# Patient Record
Sex: MALE | Race: OTHER | NOT HISPANIC OR LATINO | Employment: FULL TIME | URBAN - METROPOLITAN AREA
[De-identification: names, ages, dates, MRNs, and addresses within clinical notes are randomized per-mention and may not be internally consistent; named-entity substitution may affect disease eponyms.]

---

## 2021-10-20 ENCOUNTER — HOSPITAL ENCOUNTER (OUTPATIENT)
Dept: RADIOLOGY | Facility: HOSPITAL | Age: 56
Discharge: HOME | End: 2021-10-20
Attending: INTERNAL MEDICINE
Payer: COMMERCIAL

## 2021-10-20 DIAGNOSIS — I15.0 RENOVASCULAR HYPERTENSION: ICD-10-CM

## 2021-10-20 PROCEDURE — 93975 VASCULAR STUDY: CPT | Performed by: SURGERY

## 2021-10-20 PROCEDURE — 93975 VASCULAR STUDY: CPT

## 2022-12-30 ENCOUNTER — TELEPHONE (OUTPATIENT)
Dept: FAMILY MEDICINE CLINIC | Facility: CLINIC | Age: 57
End: 2022-12-30

## 2022-12-30 DIAGNOSIS — Z13.0 SCREENING FOR DEFICIENCY ANEMIA: ICD-10-CM

## 2022-12-30 DIAGNOSIS — E78.5 DYSLIPIDEMIA: ICD-10-CM

## 2022-12-30 DIAGNOSIS — R73.03 PRE-DIABETES: ICD-10-CM

## 2022-12-30 DIAGNOSIS — Z13.29 SCREENING FOR THYROID DISORDER: ICD-10-CM

## 2022-12-30 DIAGNOSIS — E53.8 B12 DEFICIENCY: ICD-10-CM

## 2022-12-30 DIAGNOSIS — Z12.5 SCREENING FOR PROSTATE CANCER: Primary | ICD-10-CM

## 2022-12-30 DIAGNOSIS — E55.9 VITAMIN D DEFICIENCY: ICD-10-CM

## 2023-01-04 DIAGNOSIS — E53.8 B12 DEFICIENCY: ICD-10-CM

## 2023-01-04 RX ORDER — CYANOCOBALAMIN 1000 UG/ML
INJECTION, SOLUTION INTRAMUSCULAR; SUBCUTANEOUS
Qty: 6 ML | Refills: 0 | Status: SHIPPED | OUTPATIENT
Start: 2023-01-04 | End: 2023-01-12 | Stop reason: SDUPTHER

## 2023-01-09 LAB
25(OH)D3+25(OH)D2 SERPL-MCNC: 34.2 NG/ML (ref 30–100)
ALBUMIN SERPL-MCNC: 4.6 G/DL (ref 3.8–4.9)
ALBUMIN/GLOB SERPL: 1.8 {RATIO} (ref 1.2–2.2)
ALP SERPL-CCNC: 110 IU/L (ref 44–121)
ALT SERPL-CCNC: 19 IU/L (ref 0–44)
APPEARANCE UR: CLEAR
AST SERPL-CCNC: 15 IU/L (ref 0–40)
BACTERIA UR CULT: NORMAL
BACTERIA URNS QL MICRO: ABNORMAL
BASOPHILS # BLD AUTO: 0.1 X10E3/UL (ref 0–0.2)
BASOPHILS NFR BLD AUTO: 1 %
BILIRUB SERPL-MCNC: 0.7 MG/DL (ref 0–1.2)
BILIRUB UR QL STRIP: NEGATIVE
BUN SERPL-MCNC: 12 MG/DL (ref 6–24)
BUN/CREAT SERPL: 11 (ref 9–20)
CALCIUM SERPL-MCNC: 9.7 MG/DL (ref 8.7–10.2)
CASTS URNS QL MICRO: ABNORMAL /LPF
CHLORIDE SERPL-SCNC: 100 MMOL/L (ref 96–106)
CHOLEST SERPL-MCNC: 251 MG/DL (ref 100–199)
CO2 SERPL-SCNC: 27 MMOL/L (ref 20–29)
COLOR UR: YELLOW
CREAT SERPL-MCNC: 1.14 MG/DL (ref 0.76–1.27)
EGFR: 75 ML/MIN/1.73
EOSINOPHIL # BLD AUTO: 0.1 X10E3/UL (ref 0–0.4)
EOSINOPHIL NFR BLD AUTO: 2 %
EPI CELLS #/AREA URNS HPF: ABNORMAL /HPF (ref 0–10)
ERYTHROCYTE [DISTWIDTH] IN BLOOD BY AUTOMATED COUNT: 12.5 % (ref 11.6–15.4)
GLOBULIN SER-MCNC: 2.5 G/DL (ref 1.5–4.5)
GLUCOSE SERPL-MCNC: 102 MG/DL (ref 70–99)
GLUCOSE UR QL: NEGATIVE
HBA1C MFR BLD: 5.7 % (ref 4.8–5.6)
HCT VFR BLD AUTO: 45 % (ref 37.5–51)
HDLC SERPL-MCNC: 43 MG/DL
HGB BLD-MCNC: 15.3 G/DL (ref 13–17.7)
HGB UR QL STRIP: NEGATIVE
IMM GRANULOCYTES # BLD: 0 X10E3/UL (ref 0–0.1)
IMM GRANULOCYTES NFR BLD: 0 %
KETONES UR QL STRIP: NEGATIVE
LDLC SERPL CALC-MCNC: 163 MG/DL (ref 0–99)
LEUKOCYTE ESTERASE UR QL STRIP: NEGATIVE
LYMPHOCYTES # BLD AUTO: 1.9 X10E3/UL (ref 0.7–3.1)
LYMPHOCYTES NFR BLD AUTO: 28 %
Lab: NORMAL
MCH RBC QN AUTO: 29.5 PG (ref 26.6–33)
MCHC RBC AUTO-ENTMCNC: 34 G/DL (ref 31.5–35.7)
MCV RBC AUTO: 87 FL (ref 79–97)
MICRO URNS: NORMAL
MICRO URNS: NORMAL
MONOCYTES # BLD AUTO: 0.5 X10E3/UL (ref 0.1–0.9)
MONOCYTES NFR BLD AUTO: 8 %
NEUTROPHILS # BLD AUTO: 4.1 X10E3/UL (ref 1.4–7)
NEUTROPHILS NFR BLD AUTO: 61 %
NITRITE UR QL STRIP: NEGATIVE
PH UR STRIP: 6 [PH] (ref 5–7.5)
PLATELET # BLD AUTO: 260 X10E3/UL (ref 150–450)
POTASSIUM SERPL-SCNC: 4.7 MMOL/L (ref 3.5–5.2)
PROT SERPL-MCNC: 7.1 G/DL (ref 6–8.5)
PROT UR QL STRIP: NEGATIVE
PSA SERPL-MCNC: 0.8 NG/ML (ref 0–4)
RBC # BLD AUTO: 5.18 X10E6/UL (ref 4.14–5.8)
RBC #/AREA URNS HPF: ABNORMAL /HPF (ref 0–2)
SL AMB URINALYSIS REFLEX: NORMAL
SL AMB VLDL CHOLESTEROL CALC: 45 MG/DL (ref 5–40)
SODIUM SERPL-SCNC: 142 MMOL/L (ref 134–144)
SP GR UR: 1.02 (ref 1–1.03)
TRIGL SERPL-MCNC: 244 MG/DL (ref 0–149)
TSH SERPL DL<=0.005 MIU/L-ACNC: 4.28 UIU/ML (ref 0.45–4.5)
UROBILINOGEN UR STRIP-ACNC: 0.2 MG/DL (ref 0.2–1)
VIT B12 SERPL-MCNC: 405 PG/ML (ref 232–1245)
WBC # BLD AUTO: 6.8 X10E3/UL (ref 3.4–10.8)
WBC #/AREA URNS HPF: ABNORMAL /HPF (ref 0–5)

## 2023-01-12 ENCOUNTER — OFFICE VISIT (OUTPATIENT)
Dept: FAMILY MEDICINE CLINIC | Facility: CLINIC | Age: 58
End: 2023-01-12

## 2023-01-12 VITALS — HEIGHT: 68 IN | HEART RATE: 102 BPM | BODY MASS INDEX: 28.04 KG/M2 | WEIGHT: 185 LBS | OXYGEN SATURATION: 100 %

## 2023-01-12 DIAGNOSIS — E53.8 B12 DEFICIENCY: ICD-10-CM

## 2023-01-12 DIAGNOSIS — E78.5 DYSLIPIDEMIA: ICD-10-CM

## 2023-01-12 DIAGNOSIS — R73.03 PREDIABETES: ICD-10-CM

## 2023-01-12 DIAGNOSIS — Z00.00 ANNUAL PHYSICAL EXAM: Primary | ICD-10-CM

## 2023-01-12 DIAGNOSIS — R73.03 PRE-DIABETES: ICD-10-CM

## 2023-01-12 RX ORDER — CYANOCOBALAMIN 1000 UG/ML
1000 INJECTION, SOLUTION INTRAMUSCULAR; SUBCUTANEOUS
Qty: 10 ML | Refills: 1 | Status: SHIPPED | OUTPATIENT
Start: 2023-01-12

## 2023-01-12 RX ORDER — MELATONIN
1000 2 TIMES DAILY
COMMUNITY

## 2023-01-12 RX ORDER — NEEDLES, DISPOSABLE 25GX5/8"
NEEDLE, DISPOSABLE MISCELLANEOUS
Qty: 50 EACH | Refills: 1 | Status: SHIPPED | OUTPATIENT
Start: 2023-01-12

## 2023-01-12 NOTE — PATIENT INSTRUCTIONS

## 2023-01-12 NOTE — PROGRESS NOTES
ADULT ANNUAL [address] [place],  [address] [facility]ARE [facility]    NAME: Bakari Ham  AGE: 61 y o  SEX: male  : 1965     DATE: 2023     Assessment and Plan:     Problem List Items Addressed This Visit        Other    B12 deficiency     Patient with a history of B12 deficiency currently taking B12 injections 1000 mcg monthly we will continue the same last level is 405         Relevant Medications    cyanocobalamin 1,000 mcg/mL    NEEDLE, DISP, 25 G (BD Disp Needles) 25G X 5/8" MISC    SYRINGE-NEEDLE, DISP, 3 ML (Luer Lock Safety Syringes) 25G X 5/8" 3 ML MISC    Dyslipidemia     Patient cholesterol is 251 triglycerides 244 HDL is 43 LDL is 163 currently not on any medications and patient is also hesitant to start now with medications  We will repeat the labs in 3 more months and if it is still high strongly suggest to start the medication  Relevant Orders    Lipid panel    Prediabetes     Patient's A1c came at 5 7 the prediabetic range emphasized regarding diet exercise monitoring his sugar intake carbohydrate intake and will follow with repeat blood sugar and hemoglobin A1c at a later date  Other Visit Diagnoses     Annual physical exam    -  Primary    Pre-diabetes        Relevant Orders    Hemoglobin A1C          Immunizations and preventive care screenings were discussed with patient today  Appropriate education was printed on patient's after visit summary  Discussed risks and benefits of prostate cancer screening  We discussed the controversial history of PSA screening for prostate cancer in the [place] as well as the risk of over detection and over treatment of prostate cancer by way of PSA screening    The patient understands that PSA blood testing is an imperfect way to screen for prostate cancer and that elevated PSA levels in the blood may also be caused by infection, inflammation, prostatic trauma or manipulation, urological procedures, or by benign prostatic enlargement  The role of the digital rectal examination in prostate cancer screening was also discussed and I discussed with him that there is large interobserver variability in the findings of digital rectal examination  Counseling:  · Alcohol/drug use: discussed moderation in alcohol intake, the recommendations for healthy alcohol use, and avoidance of illicit drug use  BMI Counseling: Body mass index is 28 13 kg/m²  The BMI is above normal  Nutrition recommendations include decreasing portion sizes, limiting drinks that contain sugar and moderation in carbohydrate intake  Exercise recommendations include moderate physical activity 150 minutes/week  Rationale for BMI follow-up plan is due to patient being overweight or obese  Return in about 3 months (around 4/12/2023)  Chief Complaint:     Chief Complaint   Patient presents with   • Physical Exam      History of Present Illness:     Adult Annual Physical   Patient here for a comprehensive physical exam  The patient reports problems - Patient with prediabetes, dyslipidemia history of B12 deficiency  Diet and Physical Activity  · Diet/Nutrition: well balanced diet and low fat diet  · Exercise: walking and 5-7 times a week on average  Depression Screening  PHQ-2/9 Depression Screening         General Health  · Sleep: sleeps well and gets 7-8 hours of sleep on average  · Hearing: normal - bilateral   · Vision: no vision problems  · Dental: regular dental visits   Health  · Symptoms include: none     Review of Systems:     Review of Systems   Constitutional: Negative for chills and fever  HENT: Negative for ear pain and sore throat  Eyes: Negative for pain and visual disturbance  Respiratory: Negative for cough and shortness of breath  Cardiovascular: Negative for chest pain and palpitations  Gastrointestinal: Negative for abdominal pain and vomiting     Genitourinary: Negative for dysuria and hematuria  Musculoskeletal: Negative for arthralgias and back pain  Skin: Negative for color change and rash  Neurological: Negative for seizures and syncope  All other systems reviewed and are negative  Past Medical History:     History reviewed  No pertinent past medical history  Past Surgical History:     History reviewed  No pertinent surgical history  Family History:     History reviewed  No pertinent family history  Social History:     Social History     Socioeconomic History   • Marital status: /Civil Union     Spouse name: None   • Number of children: None   • Years of education: None   • Highest education level: None   Occupational History   • None   Tobacco Use   • Smoking status: Never   • Smokeless tobacco: Never   Substance and Sexual Activity   • Alcohol use: None   • Drug use: None   • Sexual activity: None   Other Topics Concern   • None   Social History Narrative   • None     Social Determinants of Health     Financial Resource Strain: Not on file   Food Insecurity: Not on file   Transportation Needs: Not on file   Physical Activity: Not on file   Stress: Not on file   Social Connections: Not on file   Intimate Partner Violence: Not on file   Housing Stability: Not on file      Current Medications:     Current Outpatient Medications   Medication Sig Dispense Refill   • cholecalciferol (VITAMIN D3) 1,000 units tablet Take 1,000 Units by mouth 2 (two) times a day     • cyanocobalamin 1,000 mcg/mL Inject 1 mL (1,000 mcg total) into a muscle every 14 (fourteen) days 10 mL 1   • NEEDLE, DISP, 25 G (BD Disp Needles) 25G X 5/8" MISC Use every 14 (fourteen) days 50 each 1   • SYRINGE-NEEDLE, DISP, 3 ML (Luer Lock Safety Syringes) 25G X 5/8" 3 ML MISC Use every 14 (fourteen) days 50 each 0     No current facility-administered medications for this visit        Allergies:     No Known Allergies   Physical Exam:     Pulse 102   Ht 5' 8" (1 727 m)   Wt 83 9 kg (185 lb) SpO2 100%   BMI 28 13 kg/m²     Physical Exam  Vitals and nursing note reviewed  Constitutional:       General: He is not in acute distress  Appearance: He is well-developed  HENT:      Head: Normocephalic and atraumatic  Eyes:      Conjunctiva/sclera: Conjunctivae normal    Cardiovascular:      Rate and Rhythm: Normal rate and regular rhythm  Heart sounds: No murmur heard  Pulmonary:      Effort: Pulmonary effort is normal  No respiratory distress  Breath sounds: Normal breath sounds  Abdominal:      Palpations: Abdomen is soft  Tenderness: There is no abdominal tenderness  Musculoskeletal:         General: No swelling  Cervical back: Neck supple  Skin:     General: Skin is warm and dry  Neurological:      Mental Status: He is alert and oriented to person, place, and time     Psychiatric:         Mood and Affect: Mood normal           Tali Spears MD  [facility]

## 2023-01-15 PROBLEM — R73.03 PREDIABETES: Status: ACTIVE | Noted: 2023-01-15

## 2023-01-15 PROBLEM — E53.8 B12 DEFICIENCY: Status: ACTIVE | Noted: 2023-01-15

## 2023-01-15 PROBLEM — E78.5 DYSLIPIDEMIA: Status: ACTIVE | Noted: 2023-01-15

## 2023-01-15 NOTE — ASSESSMENT & PLAN NOTE
Patient cholesterol is 251 triglycerides 244 HDL is 43 LDL is 163 currently not on any medications and patient is also hesitant to start now with medications  We will repeat the labs in 3 more months and if it is still high strongly suggest to start the medication

## 2023-01-15 NOTE — ASSESSMENT & PLAN NOTE
Patient's A1c came at 5 7 the prediabetic range emphasized regarding diet exercise monitoring his sugar intake carbohydrate intake and will follow with repeat blood sugar and hemoglobin A1c at a later date

## 2023-01-15 NOTE — ASSESSMENT & PLAN NOTE
Patient with a history of B12 deficiency currently taking B12 injections 1000 mcg monthly we will continue the same last level is 405

## 2023-04-07 ENCOUNTER — RA CDI HCC (OUTPATIENT)
Dept: OTHER | Facility: HOSPITAL | Age: 58
End: 2023-04-07

## 2023-04-07 NOTE — PROGRESS NOTES
[facility] 75  coding opportunities       Chart reviewed, no opportunity found: CHART REVIEWED, NO OPPORTUNITY FOUND        Patients Insurance        Commercial Insurance: [employer]

## 2023-04-13 PROBLEM — R07.89 ATYPICAL CHEST PAIN: Status: ACTIVE | Noted: 2023-04-13

## 2023-04-13 PROBLEM — R10.13 DYSPEPSIA: Status: ACTIVE | Noted: 2023-04-13

## 2023-06-01 ENCOUNTER — OFFICE VISIT (OUTPATIENT)
Dept: GASTROENTEROLOGY | Facility: CLINIC | Age: 58
End: 2023-06-01

## 2023-06-01 VITALS
HEIGHT: 68 IN | BODY MASS INDEX: 28.19 KG/M2 | DIASTOLIC BLOOD PRESSURE: 105 MMHG | WEIGHT: 186 LBS | SYSTOLIC BLOOD PRESSURE: 167 MMHG | HEART RATE: 80 BPM

## 2023-06-01 DIAGNOSIS — Z53.20 COLON CANCER SCREENING DECLINED: ICD-10-CM

## 2023-06-01 DIAGNOSIS — E53.8 VITAMIN B12 DEFICIENCY: ICD-10-CM

## 2023-06-01 DIAGNOSIS — Z12.11 SCREEN FOR COLON CANCER: Primary | ICD-10-CM

## 2023-06-01 DIAGNOSIS — K21.9 GASTROESOPHAGEAL REFLUX DISEASE WITHOUT ESOPHAGITIS: ICD-10-CM

## 2023-06-01 NOTE — PROGRESS NOTES
[facility] - Outpatient Consultation  Brenna Randolph 60 y o  male MRN: [id number]  Encounter: [id number]          ASSESSMENT AND PLAN:      1  Screen for colon cancer    2  Colon cancer screening declined    3  Gastroesophageal reflux disease without esophagitis    4  Vitamin B12 deficiency      Patient has occasional heartburn indigestion more likely symptomatic treatment with Tums helps  History of B12 deficiency, may be associated with atrophic gastritis H  pylori infection  Gives history of pernicious anemia  Screening for colon cancer discussed, colonoscopy option and prep discussed at length  He wants to think about this and will call us when he is ready  We will suggest EGD at the same time because of above   ______________________________________________________________________    HPI:    Patient came in for evaluation of his occasional heartburn indigestion especially if he eats something spicy hot fried foods, appetite is fair weight stable, denies dysphagia coughing choking spells nocturnal reflux agitation bronchitis pneumonias  He takes Tums with symptomatic relief  Bowels are generally regular but lately has noted some stool consistency changed with use of stool  Does take vitamin B12 and vitamin D for deficiencies on a regular basis  Denies any history of ulcer disease, no GI bleeding melena hematochezia, denies chest pain shortness of breath fever chills rash, active, walks a lot  Diet medications more than 10 pertinent systems reviewed  Works in [employer], 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     REVIEW OF SYSTEMS:    CONSTITUTIONAL: Denies any fever, chills, rigors, and weight loss  HEENT: No earache or tinnitus  CARDIOVASCULAR: No chest pain or palpitations  RESPIRATORY: Denies any cough, hemoptysis, shortness of breath or dyspnea on exertion  GASTROINTESTINAL: As noted in the History of Present Illness     GENITOURINARY: Denies any "hematuria or dysuria  NEUROLOGIC: No dizziness or vertigo  MUSCULOSKELETAL: Denies any joint swellings  SKIN: Denies skin rashes or itching  ENDOCRINE: Denies excessive thirst  Denies intolerance to heat or cold  PSYCHOSOCIAL: Denies depression or anxiety  Denies any recent memory loss  Historical Information   History reviewed  No pertinent past medical history  History reviewed  No pertinent surgical history  Social History   Social History     Substance and Sexual Activity   Alcohol Use Never     Social History     Substance and Sexual Activity   Drug Use Never     Social History     Tobacco Use   Smoking Status Never   Smokeless Tobacco Never     History reviewed  No pertinent family history  Meds/Allergies       Current Outpatient Medications:   •  cholecalciferol (VITAMIN D3) 1,000 units tablet  •  cyanocobalamin 1,000 mcg/mL  •  NEEDLE, DISP, 25 G (BD Disp Needles) 25G X 5/8\" MISC  •  SYRINGE-NEEDLE, DISP, 3 ML (Luer Lock Safety Syringes) 25G X 5/8\" 3 ML MISC    No Known Allergies        Objective     Blood pressure (!) 167/105, pulse 80, height 5' 8\" (1 727 m), weight 84 4 kg (186 lb)  Body mass index is 28 28 kg/m²  PHYSICAL EXAM:      General Appearance:   Alert, cooperative, no distress   HEENT:   Normocephalic, atraumatic, anicteric  Neck:  Supple, symmetrical, trachea midline   Lungs:   Clear to auscultation bilaterally; no rales, rhonchi or wheezing; respirations unlabored    Heart[de-identified]   Regular rate and rhythm; no murmur  Abdomen:   Soft, non-tender, non-distended; normal bowel sounds; no masses, no organomegaly    Genitalia:   Deferred    Rectal:   Deferred    Extremities:  No cyanosis, clubbing or edema    Skin:  No jaundice, rashes, or lesions    Lymph nodes:  No palpable cervical lymphadenopathy        Lab Results:   No visits with results within 1 Day(s) from this visit     Latest known visit with results is:   Orders Only on 04/08/2023   Component Date Value   • " Cholesterol, Total 04/08/2023 238 (H)    • Triglycerides 04/08/2023 272 (H)    • HDL 04/08/2023 43    • VLDL Cholesterol Calcula* 04/08/2023 50 (H)    • LDL Calculated 04/08/2023 145 (H)    • Hemoglobin A1C 04/08/2023 5 9 (H)          Radiology Results:   No results found

## 2023-06-02 ENCOUNTER — TELEPHONE (OUTPATIENT)
Dept: OTHER | Facility: OTHER | Age: 58
End: 2023-06-02

## 2023-06-02 NOTE — TELEPHONE ENCOUNTER
Patient is calling to report that he was seen in Rm 2 at the office yesterday and his BP was reading unuasually high  He went home and measured it under various conditions and it was normal each time  He thinks you might want to check and recalibrate  the cuff

## 2023-07-26 LAB
CHOLEST SERPL-MCNC: 221 MG/DL (ref 100–199)
HDLC SERPL-MCNC: 47 MG/DL
LDLC SERPL CALC-MCNC: 149 MG/DL (ref 0–99)
SL AMB VLDL CHOLESTEROL CALC: 25 MG/DL (ref 5–40)
TRIGL SERPL-MCNC: 139 MG/DL (ref 0–149)

## 2023-07-28 ENCOUNTER — OFFICE VISIT (OUTPATIENT)
Dept: FAMILY MEDICINE CLINIC | Facility: CLINIC | Age: 58
End: 2023-07-28
Payer: COMMERCIAL

## 2023-07-28 VITALS
BODY MASS INDEX: 27.37 KG/M2 | SYSTOLIC BLOOD PRESSURE: 128 MMHG | HEIGHT: 68 IN | HEART RATE: 86 BPM | WEIGHT: 180.6 LBS | OXYGEN SATURATION: 100 % | DIASTOLIC BLOOD PRESSURE: 78 MMHG

## 2023-07-28 DIAGNOSIS — Z13.0 SCREENING FOR DEFICIENCY ANEMIA: ICD-10-CM

## 2023-07-28 DIAGNOSIS — R73.03 PRE-DIABETES: ICD-10-CM

## 2023-07-28 DIAGNOSIS — E55.9 VITAMIN D DEFICIENCY: ICD-10-CM

## 2023-07-28 DIAGNOSIS — Z12.5 SCREENING FOR PROSTATE CANCER: ICD-10-CM

## 2023-07-28 DIAGNOSIS — E53.8 B12 DEFICIENCY: Primary | ICD-10-CM

## 2023-07-28 DIAGNOSIS — Z13.29 SCREENING FOR THYROID DISORDER: ICD-10-CM

## 2023-07-28 DIAGNOSIS — E78.5 DYSLIPIDEMIA: ICD-10-CM

## 2023-07-28 DIAGNOSIS — R73.03 PREDIABETES: ICD-10-CM

## 2023-07-28 PROCEDURE — 99214 OFFICE O/P EST MOD 30 MIN: CPT | Performed by: INTERNAL MEDICINE

## 2023-07-28 NOTE — PROGRESS NOTES
Office Visit Note  23     Ld Woods 60 y.o. male MRN: [id number]  : 1965    Assessment:     1. Dyslipidemia  Assessment & Plan:  Patient with history of elevated cholesterol levels came down from 2 38-2 21 with an LDL of 149 patient is still trying to follow a strict diet we will repeat the levels in about 5 months from now and then reevaluate      2. B12 deficiency  Assessment & Plan:  Patient with a history of B12 deficiency is getting B12 injection 1000 mcg every 2 weeks we will follow-up with repeat level      3. Prediabetes  Assessment & Plan:  Last A1c was at 5.9 patient is trying to follow strict diet with good exercise we will follow-up with repeat A1c in few months from now and then decide. Discussion Summary and Plan: Today's care plan and medications were reviewed with patient in detail and all their questions answered to their satisfaction. Chief Complaint   Patient presents with   • Follow-up      Subjective:  Patient is coming here for a follow-up evaluation with regards to his symptoms of prediabetes, hyperlipidemia, hypercholesterolemia. Patient has been doing vigorous exercise lost about 6 pounds in last few months. He denies any symptoms of chest pain palpitation shortness of breath. Patient was seen by the gastroenterologist with regards to colorectal screening at present time is preferred to not to go through the colonoscopy I discussed with him about the Cologuard which we are going to order. Patient had lab work done cholesterol came down to 221 and LDL is at 149 triglycerides went down to 139. The following portions of the patient's history were reviewed and updated as appropriate: allergies, current medications, past family history, past medical history, past social history, past surgical history and problem list.    Review of Systems   Constitutional: Negative for chills and fever. HENT: Negative for ear pain and sore throat.     Eyes: Negative for pain and visual disturbance. Respiratory: Negative for cough and shortness of breath. Cardiovascular: Negative for chest pain and palpitations. Gastrointestinal: Negative for abdominal pain and vomiting. Genitourinary: Negative for dysuria and hematuria. Musculoskeletal: Negative for arthralgias and back pain. Skin: Negative for color change and rash. Neurological: Negative for seizures and syncope. All other systems reviewed and are negative. Historical Information   Patient Active Problem List   Diagnosis   • B12 deficiency   • Dyslipidemia   • Prediabetes   • Dyspepsia   • Atypical chest pain     History reviewed. No pertinent past medical history. History reviewed. No pertinent surgical history. Social History     Substance and Sexual Activity   Alcohol Use Never     Social History     Substance and Sexual Activity   Drug Use Never     Social History     Tobacco Use   Smoking Status Never   Smokeless Tobacco Never     History reviewed. No pertinent family history. Health Maintenance Due   Topic   • Hepatitis C Screening    • HIV Screening    • DTaP,Tdap,and Td Vaccines (1 - Tdap)   • Colorectal Cancer Screening    • COVID-19 Vaccine (3 - Pfizer series)   • Influenza Vaccine (1)   • BMI: Followup Plan       Meds/Allergies       Current Outpatient Medications:   •  cholecalciferol (VITAMIN D3) 1,000 units tablet, Take 1,000 Units by mouth 2 (two) times a day, Disp: , Rfl:   •  cyanocobalamin 1,000 mcg/mL, Inject 1 mL (1,000 mcg total) into a muscle every 14 (fourteen) days, Disp: 10 mL, Rfl: 1  •  SYRINGE-NEEDLE, DISP, 3 ML (Luer Lock Safety Syringes) 25G X 5/8" 3 ML MISC, Use every 14 (fourteen) days, Disp: 50 each, Rfl: 0      Objective:    Vitals:   /78 (BP Location: Right arm, Patient Position: Sitting, Cuff Size: Standard)   Pulse 86   Ht 5' 8" (1.727 m)   Wt 81.9 kg (180 lb 9.6 oz)   SpO2 100%   BMI 27.46 kg/m²   Body mass index is 27.46 kg/m².   Vitals: 07/28/23 1226   Weight: 81.9 kg (180 lb 9.6 oz)       Physical Exam  Vitals and nursing note reviewed. Constitutional:       General: He is not in acute distress. Appearance: Normal appearance. HENT:      Head: Normocephalic. Right Ear: Tympanic membrane and ear canal normal.      Left Ear: Tympanic membrane and ear canal normal.      Mouth/Throat:      Mouth: Mucous membranes are moist.   Eyes:      Pupils: Pupils are equal, round, and reactive to light. Cardiovascular:      Rate and Rhythm: Normal rate and regular rhythm. Heart sounds: Normal heart sounds. Pulmonary:      Effort: Pulmonary effort is normal. No respiratory distress. Breath sounds: Normal breath sounds. Abdominal:      General: Abdomen is flat. Palpations: Abdomen is soft. Musculoskeletal:      Cervical back: Normal range of motion and neck supple. Right lower leg: No edema. Left lower leg: No edema. Skin:     General: Skin is warm. Neurological:      Mental Status: He is alert and oriented to person, place, and time. Psychiatric:         Mood and Affect: Mood normal.         Lab Review   Orders Only on 07/25/2023   Component Date Value Ref Range Status   • Cholesterol, Total 07/25/2023 221 (H)  100 - 199 mg/dL Final   • Triglycerides 07/25/2023 139  0 - 149 mg/dL Final   • HDL 07/25/2023 47  >39 mg/dL Final   • VLDL Cholesterol Calculated 07/25/2023 25  5 - 40 mg/dL Final   • LDL Calculated 07/25/2023 149 (H)  0 - 99 mg/dL Final         Tricia Lynch MD        "This note has been constructed using a voice recognition system. Therefore there may be syntax, spelling, and/or grammatical errors.  Please call if you have any questions. "

## 2023-07-28 NOTE — ASSESSMENT & PLAN NOTE
Patient with history of elevated cholesterol levels came down from 2 38-2 21 with an LDL of 149 patient is still trying to follow a strict diet we will repeat the levels in about 5 months from now and then reevaluate

## 2023-07-28 NOTE — ASSESSMENT & PLAN NOTE
Patient with a history of B12 deficiency is getting B12 injection 1000 mcg every 2 weeks we will follow-up with repeat level

## 2023-07-28 NOTE — ASSESSMENT & PLAN NOTE
Last A1c was at 5.9 patient is trying to follow strict diet with good exercise we will follow-up with repeat A1c in few months from now and then decide.

## 2023-07-28 NOTE — ASSESSMENT & PLAN NOTE
Patient was seen by the gastroenterologist with regards to upper and lower endoscopy with a history of B12 deficiency the possibility of H. pylori because of the family history is there if patient decides we can get the upper and lower endoscopy done but meanwhile we will get the Cologuard test done.

## 2024-01-22 ENCOUNTER — RA CDI HCC (OUTPATIENT)
Dept: OTHER | Facility: HOSPITAL | Age: 59
End: 2024-01-22

## 2024-01-26 ENCOUNTER — OFFICE VISIT (OUTPATIENT)
Dept: FAMILY MEDICINE CLINIC | Facility: CLINIC | Age: 59
End: 2024-01-26
Payer: COMMERCIAL

## 2024-01-26 VITALS
HEART RATE: 90 BPM | BODY MASS INDEX: 28.19 KG/M2 | WEIGHT: 186 LBS | TEMPERATURE: 99 F | HEIGHT: 68 IN | OXYGEN SATURATION: 99 % | SYSTOLIC BLOOD PRESSURE: 130 MMHG | DIASTOLIC BLOOD PRESSURE: 78 MMHG

## 2024-01-26 DIAGNOSIS — R10.13 DYSPEPSIA: ICD-10-CM

## 2024-01-26 DIAGNOSIS — E53.8 B12 DEFICIENCY: ICD-10-CM

## 2024-01-26 DIAGNOSIS — R73.03 PREDIABETES: ICD-10-CM

## 2024-01-26 DIAGNOSIS — J20.8 ACUTE BRONCHITIS DUE TO OTHER SPECIFIED ORGANISMS: Primary | ICD-10-CM

## 2024-01-26 DIAGNOSIS — J20.9 ACUTE BRONCHITIS, UNSPECIFIED ORGANISM: ICD-10-CM

## 2024-01-26 DIAGNOSIS — E78.5 DYSLIPIDEMIA: ICD-10-CM

## 2024-01-26 PROCEDURE — 99214 OFFICE O/P EST MOD 30 MIN: CPT | Performed by: INTERNAL MEDICINE

## 2024-01-26 RX ORDER — AZITHROMYCIN 250 MG/1
TABLET, FILM COATED ORAL
Qty: 6 TABLET | Refills: 0 | Status: SHIPPED | OUTPATIENT
Start: 2024-01-26 | End: 2024-01-31

## 2024-01-26 NOTE — PROGRESS NOTES
Office Visit Note  24     Bakari Ham 58 y.o. male MRN: [id number]  : 1965    Assessment:     1. Acute bronchitis due to other specified organisms  Assessment & Plan:  Patient with cough congestion symptoms especially upper respiratory upper part of the chest bringing up small amount of whitish phlegm with low-grade temperature earlier he had symptoms of cold for almost 3 to 4 weeks.  Lungs sound mildly congested otherwise unremarkable.  Will monitor start him on Z-Severiano      2. B12 deficiency  Assessment & Plan:  History of B12 deficiency patient takes B12 injections 1000 mcg every 2 weeks will follow-up with repeat level      3. Dyslipidemia  Assessment & Plan:  History of elevated cholesterol levels last level was 221 with an LDL of 149 patient is going to have repeat lipid profile done soon based on that we will make decisions      4. Prediabetes  Assessment & Plan:  Last A1c 5.9 follow-up with repeat 1      5. Dyspepsia  Assessment & Plan:  Patient was seen by the gastroenterologist is planning to get upper and lower endoscopy scheduled as soon as possible I reviewed the notes from the gastroenterologist also.      6. Acute bronchitis, unspecified organism  -     azithromycin (ZITHROMAX) 250 mg tablet; Take 2 the first day, then take 1 daily               Discussion Summary and Plan:  Today's care plan and medications were reviewed with patient in detail and all their questions answered to their satisfaction.    Chief Complaint   Patient presents with    Cough      Subjective:  Patient is coming here for evaluation regarding symptoms of mild fever 100.3 developed yesterday and he has some generalized weakness some congestion in the upper part of the chest and throat area with cough bringing up small amount of whitish phlegm.  Earlier he had cold for almost 3 to 4 weeks at that time he was bringing up some yellowish phlegm.  All the family members seen at home have upper respiratory symptoms.   No nausea vomiting diarrhea symptoms no abdominal pain.  Patient did have headache yesterday and today's feels heavy in the head and body aches present.    Cough  Associated symptoms include myalgias. Pertinent negatives include no chest pain, chills, ear pain, fever, rash, sore throat or shortness of breath.       The following portions of the patient's history were reviewed and updated as appropriate: allergies, current medications, past family history, past medical history, past social history, past surgical history and problem list.    Review of Systems   Constitutional:  Negative for chills and fever.   HENT:  Positive for congestion. Negative for ear pain and sore throat.    Eyes:  Negative for pain and visual disturbance.   Respiratory:  Positive for cough. Negative for shortness of breath.    Cardiovascular:  Negative for chest pain and palpitations.   Gastrointestinal:  Negative for abdominal pain and vomiting.   Genitourinary:  Negative for dysuria and hematuria.   Musculoskeletal:  Positive for myalgias. Negative for arthralgias and back pain.   Skin:  Negative for color change and rash.   Neurological:  Positive for weakness. Negative for seizures and syncope.   All other systems reviewed and are negative.        Historical Information   Patient Active Problem List   Diagnosis    B12 deficiency    Dyslipidemia    Prediabetes    Dyspepsia    Atypical chest pain    Acute bronchitis due to other specified organisms     History reviewed. No pertinent past medical history.  History reviewed. No pertinent surgical history.  Social History     Substance and Sexual Activity   Alcohol Use Never     Social History     Substance and Sexual Activity   Drug Use Never     Social History     Tobacco Use   Smoking Status Never   Smokeless Tobacco Never     History reviewed. No pertinent family history.  Health Maintenance Due   Topic    Hepatitis C Screening     HIV Screening     DTaP,Tdap,and Td Vaccines (1 - Tdap)     "Colorectal Cancer Screening     Zoster Vaccine (1 of 2)    COVID-19 Vaccine (3 - 2023-24 season)    Annual Physical     Depression Screening       Meds/Allergies       Current Outpatient Medications:     azithromycin (ZITHROMAX) 250 mg tablet, Take 2 the first day, then take 1 daily, Disp: 6 tablet, Rfl: 0    cholecalciferol (VITAMIN D3) 1,000 units tablet, Take 1,000 Units by mouth 2 (two) times a day, Disp: , Rfl:     cyanocobalamin 1,000 mcg/mL, Inject 1 mL (1,000 mcg total) into a muscle every 14 (fourteen) days, Disp: 10 mL, Rfl: 1    SYRINGE-NEEDLE, DISP, 3 ML (Luer Lock Safety Syringes) 25G X 5/8\" 3 ML MISC, Use every 14 (fourteen) days, Disp: 50 each, Rfl: 0      Objective:    Vitals:   /78 (BP Location: Right arm, Patient Position: Sitting, Cuff Size: Standard)   Pulse 90   Temp 99 °F (37.2 °C)   Ht 5' 8\" (1.727 m)   Wt 84.4 kg (186 lb)   SpO2 99%   BMI 28.28 kg/m²   Body mass index is 28.28 kg/m².  Vitals:    01/26/24 1149   Weight: 84.4 kg (186 lb)       Physical Exam  Vitals and nursing note reviewed.   Constitutional:       Appearance: Normal appearance.   Cardiovascular:      Rate and Rhythm: Normal rate and regular rhythm.      Heart sounds: Normal heart sounds.   Pulmonary:      Effort: Pulmonary effort is normal.      Breath sounds: Normal breath sounds.   Abdominal:      General: Abdomen is flat.      Palpations: Abdomen is soft.   Musculoskeletal:      Right lower leg: No edema.      Left lower leg: No edema.   Skin:     General: Skin is warm and dry.   Neurological:      Mental Status: He is alert and oriented to person, place, and time.   Psychiatric:         Mood and Affect: Mood normal.         Behavior: Behavior normal.         Lab Review   No visits with results within 2 Month(s) from this visit.   Latest known visit with results is:   Orders Only on 07/25/2023   Component Date Value Ref Range Status    Cholesterol, Total 07/25/2023 221 (H)  100 - 199 mg/dL Final    Triglycerides " "07/25/2023 139  0 - 149 mg/dL Final    HDL 07/25/2023 47  >39 mg/dL Final    VLDL Cholesterol Calculated 07/25/2023 25  5 - 40 mg/dL Final    LDL Calculated 07/25/2023 149 (H)  0 - 99 mg/dL Final         Charlotte House MD        \"This note has been constructed using a voice recognition system.Therefore there may be syntax, spelling, and/or grammatical errors. Please call if you have any questions. \"  "

## 2024-01-26 NOTE — ASSESSMENT & PLAN NOTE
History of B12 deficiency patient takes B12 injections 1000 mcg every 2 weeks will follow-up with repeat level

## 2024-01-26 NOTE — ASSESSMENT & PLAN NOTE
History of elevated cholesterol levels last level was 221 with an LDL of 149 patient is going to have repeat lipid profile done soon based on that we will make decisions

## 2024-01-26 NOTE — ASSESSMENT & PLAN NOTE
Patient was seen by the gastroenterologist is planning to get upper and lower endoscopy scheduled as soon as possible I reviewed the notes from the gastroenterologist also.

## 2024-01-26 NOTE — ASSESSMENT & PLAN NOTE
Patient with cough congestion symptoms especially upper respiratory upper part of the chest bringing up small amount of whitish phlegm with low-grade temperature earlier he had symptoms of cold for almost 3 to 4 weeks.  Lungs sound mildly congested otherwise unremarkable.  Will monitor start him on Z-Severiano

## 2024-02-06 LAB
25(OH)D3+25(OH)D2 SERPL-MCNC: 31.6 NG/ML (ref 30–100)
ALBUMIN SERPL-MCNC: 4.5 G/DL (ref 3.8–4.9)
ALBUMIN/GLOB SERPL: 2 {RATIO} (ref 1.2–2.2)
ALP SERPL-CCNC: 99 IU/L (ref 44–121)
ALT SERPL-CCNC: 12 IU/L (ref 0–44)
APPEARANCE UR: CLEAR
AST SERPL-CCNC: 16 IU/L (ref 0–40)
BACTERIA URNS QL MICRO: NORMAL
BASOPHILS # BLD AUTO: 0.1 X10E3/UL (ref 0–0.2)
BASOPHILS NFR BLD AUTO: 1 %
BILIRUB SERPL-MCNC: 0.5 MG/DL (ref 0–1.2)
BILIRUB UR QL STRIP: NEGATIVE
BUN SERPL-MCNC: 10 MG/DL (ref 6–24)
BUN/CREAT SERPL: 9 (ref 9–20)
CALCIUM SERPL-MCNC: 9.6 MG/DL (ref 8.7–10.2)
CASTS URNS QL MICRO: NORMAL /LPF
CHLORIDE SERPL-SCNC: 103 MMOL/L (ref 96–106)
CHOLEST SERPL-MCNC: 204 MG/DL (ref 100–199)
CO2 SERPL-SCNC: 26 MMOL/L (ref 20–29)
COLOR UR: YELLOW
CREAT SERPL-MCNC: 1.09 MG/DL (ref 0.76–1.27)
EGFR: 78 ML/MIN/1.73
EOSINOPHIL # BLD AUTO: 0.2 X10E3/UL (ref 0–0.4)
EOSINOPHIL NFR BLD AUTO: 3 %
EPI CELLS #/AREA URNS HPF: NORMAL /HPF (ref 0–10)
ERYTHROCYTE [DISTWIDTH] IN BLOOD BY AUTOMATED COUNT: 12.3 % (ref 11.6–15.4)
GLOBULIN SER-MCNC: 2.3 G/DL (ref 1.5–4.5)
GLUCOSE SERPL-MCNC: 99 MG/DL (ref 70–99)
GLUCOSE UR QL: NEGATIVE
HBA1C MFR BLD: 5.9 % (ref 4.8–5.6)
HCT VFR BLD AUTO: 44.7 % (ref 37.5–51)
HDLC SERPL-MCNC: 46 MG/DL
HGB BLD-MCNC: 15.1 G/DL (ref 13–17.7)
HGB UR QL STRIP: NEGATIVE
IMM GRANULOCYTES # BLD: 0 X10E3/UL (ref 0–0.1)
IMM GRANULOCYTES NFR BLD: 1 %
KETONES UR QL STRIP: NEGATIVE
LDLC SERPL CALC-MCNC: 125 MG/DL (ref 0–99)
LEUKOCYTE ESTERASE UR QL STRIP: NEGATIVE
LYMPHOCYTES # BLD AUTO: 2.4 X10E3/UL (ref 0.7–3.1)
LYMPHOCYTES NFR BLD AUTO: 34 %
MCH RBC QN AUTO: 29.5 PG (ref 26.6–33)
MCHC RBC AUTO-ENTMCNC: 33.8 G/DL (ref 31.5–35.7)
MCV RBC AUTO: 87 FL (ref 79–97)
MICRO URNS: NORMAL
MICRO URNS: NORMAL
MONOCYTES # BLD AUTO: 0.5 X10E3/UL (ref 0.1–0.9)
MONOCYTES NFR BLD AUTO: 7 %
NEUTROPHILS # BLD AUTO: 3.9 X10E3/UL (ref 1.4–7)
NEUTROPHILS NFR BLD AUTO: 54 %
NITRITE UR QL STRIP: NEGATIVE
PH UR STRIP: 5.5 [PH] (ref 5–7.5)
PLATELET # BLD AUTO: 311 X10E3/UL (ref 150–450)
POTASSIUM SERPL-SCNC: 4.6 MMOL/L (ref 3.5–5.2)
PROT SERPL-MCNC: 6.8 G/DL (ref 6–8.5)
PROT UR QL STRIP: NEGATIVE
PSA SERPL-MCNC: 0.6 NG/ML (ref 0–4)
RBC # BLD AUTO: 5.12 X10E6/UL (ref 4.14–5.8)
RBC #/AREA URNS HPF: NORMAL /HPF (ref 0–2)
SL AMB URINALYSIS REFLEX: NORMAL
SL AMB VLDL CHOLESTEROL CALC: 33 MG/DL (ref 5–40)
SODIUM SERPL-SCNC: 141 MMOL/L (ref 134–144)
SP GR UR: 1.02 (ref 1–1.03)
TRIGL SERPL-MCNC: 189 MG/DL (ref 0–149)
TSH SERPL DL<=0.005 MIU/L-ACNC: 3.39 UIU/ML (ref 0.45–4.5)
UROBILINOGEN UR STRIP-ACNC: 0.2 MG/DL (ref 0.2–1)
VIT B12 SERPL-MCNC: 589 PG/ML (ref 232–1245)
WBC # BLD AUTO: 7.1 X10E3/UL (ref 3.4–10.8)
WBC #/AREA URNS HPF: NORMAL /HPF (ref 0–5)

## 2024-02-20 ENCOUNTER — OFFICE VISIT (OUTPATIENT)
Dept: FAMILY MEDICINE CLINIC | Facility: CLINIC | Age: 59
End: 2024-02-20
Payer: COMMERCIAL

## 2024-02-20 VITALS
TEMPERATURE: 97.6 F | WEIGHT: 184 LBS | HEIGHT: 68 IN | OXYGEN SATURATION: 99 % | HEART RATE: 95 BPM | SYSTOLIC BLOOD PRESSURE: 130 MMHG | DIASTOLIC BLOOD PRESSURE: 73 MMHG | BODY MASS INDEX: 27.89 KG/M2

## 2024-02-20 DIAGNOSIS — J20.8 ACUTE BRONCHITIS DUE TO OTHER SPECIFIED ORGANISMS: Primary | ICD-10-CM

## 2024-02-20 DIAGNOSIS — E53.8 B12 DEFICIENCY: ICD-10-CM

## 2024-02-20 DIAGNOSIS — E78.5 DYSLIPIDEMIA: ICD-10-CM

## 2024-02-20 DIAGNOSIS — E55.9 VITAMIN D DEFICIENCY: ICD-10-CM

## 2024-02-20 DIAGNOSIS — R73.03 PREDIABETES: ICD-10-CM

## 2024-02-20 PROCEDURE — 99396 PREV VISIT EST AGE 40-64: CPT | Performed by: INTERNAL MEDICINE

## 2024-02-20 RX ORDER — CYANOCOBALAMIN 1000 UG/ML
1000 INJECTION, SOLUTION INTRAMUSCULAR; SUBCUTANEOUS
Qty: 10 ML | Refills: 1 | Status: SHIPPED | OUTPATIENT
Start: 2024-02-20

## 2024-02-20 NOTE — PROGRESS NOTES
"ADULT ANNUAL PHYSICAL  [facility] PRIMARY [facility]    NAME: Bakari Ham  AGE: 58 y.o. SEX: male  : 1965     DATE: 2024     Assessment and Plan:     Problem List Items Addressed This Visit          Respiratory    Acute bronchitis due to other specified organisms - Primary     Acute bronchitis resolved            Other    B12 deficiency     B12 was 589 currently taking 1000 mcg every 2 weeks will continue         Relevant Medications    cyanocobalamin 1,000 mcg/mL    SYRINGE-NEEDLE, DISP, 3 ML (Luer Lock Safety Syringes) 25G X 5/8\" 3 ML MISC    Other Relevant Orders    Vitamin B12    Dyslipidemia     Test lipid profile shows cholesterol is at 204 it was 221 triglycerides were 139 now it is 189 HDL is at 46 and LDL at 125 came down from 149 continue to monitor with diet follow-up with repeat level in few months         Relevant Orders    Lipid panel    Prediabetes     A1c remains at 5.9 same as before follow-up periodically         Relevant Orders    Hemoglobin A1C     Other Visit Diagnoses       Vitamin D deficiency        Relevant Orders    Vitamin D 25 hydroxy            Immunizations and preventive care screenings were discussed with patient today. Appropriate education was printed on patient's after visit summary.    Discussed risks and benefits of prostate cancer screening. We discussed the controversial history of PSA screening for prostate cancer in the [place] as well as the risk of over detection and over treatment of prostate cancer by way of PSA screening.  The patient understands that PSA blood testing is an imperfect way to screen for prostate cancer and that elevated PSA levels in the blood may also be caused by infection, inflammation, prostatic trauma or manipulation, urological procedures, or by benign prostatic enlargement.    The role of the digital rectal examination in prostate cancer screening was also discussed and I discussed " with him that there is large interobserver variability in the findings of digital rectal examination.    Counseling:  Alcohol/drug use: discussed moderation in alcohol intake, the recommendations for healthy alcohol use, and avoidance of illicit drug use.  Dental Health: discussed importance of regular tooth brushing, flossing, and dental visits.  Injury prevention: discussed safety/seat belts, safety helmets, smoke detectors, carbon dioxide detectors, and smoking near bedding or upholstery.  Sexual health: discussed sexually transmitted diseases, partner selection, use of condoms, avoidance of unintended pregnancy, and contraceptive alternatives.  Exercise: the importance of regular exercise/physical activity was discussed. Recommend exercise 3-5 times per week for at least 30 minutes.       Depression Screening and Follow-up Plan: Patient was screened for depression during today's encounter. They screened negative with a PHQ-2 score of 0.        Return in about 6 months (around 8/20/2024).     Chief Complaint:   Patient is coming for a follow-up evaluation wellness checkup  Chief Complaint   Patient presents with    Annual Exam      History of Present Illness:   He recently had symptoms of acute bronchitis resolved with antibiotics.  Medications reviewed currently takes B12 injections labs reviewed.  Patient does complain of joint pains especially in the night.  Currently taking B12 and also vitamin D3 level of which came back at 31 patient currently taking 2000 units we will increase it to 4000 units daily follow-up with repeat vitamin D level in few months.  Adult Annual Physical   Patient here for a comprehensive physical exam. The patient reports no problems.    Diet and Physical Activity  Diet/Nutrition: well balanced diet, limited junk food, consuming 3-5 servings of fruits/vegetables daily, adequate fiber intake, and adequate whole grain intake.   Exercise: walking, 5-7 times a week on average, and 1-2  hours on average.      Depression Screening  PHQ-2/9 Depression Screening    Little interest or pleasure in doing things: 0 - not at all  Feeling down, depressed, or hopeless: 0 - not at all  Trouble falling or staying asleep, or sleeping too much: 0 - not at all  Feeling tired or having little energy: 0 - not at all  Poor appetite or overeatin - not at all  Feeling bad about yourself - or that you are a failure or have let yourself or your family down: 0 - not at all  Trouble concentrating on things, such as reading the newspaper or watching television: 0 - not at all  Moving or speaking so slowly that other people could have noticed. Or the opposite - being so fidgety or restless that you have been moving around a lot more than usual: 0 - not at all  Thoughts that you would be better off dead, or of hurting yourself in some way: 0 - not at all  PHQ-2 Score: 0  PHQ-2 Interpretation: Negative depression screen  PHQ-9 Score: 0  PHQ-9 Interpretation: No or Minimal depression       General Health  Sleep: sleeps well and gets 7-8 hours of sleep on average.   Hearing: normal - bilateral.  Vision: no vision problems.   Dental: regular dental visits, brushes teeth twice daily, and flosses teeth occasionally.        Health  Symptoms include: none    Advanced Care Planning  Do you have an advanced directive? yes  Do you have a durable medical power of ? no  ACP document given to patient? no     Review of Systems:     Review of Systems   Constitutional:  Negative for chills and fever.   HENT:  Negative for ear pain and sore throat.    Eyes:  Negative for pain and visual disturbance.   Respiratory:  Negative for cough and shortness of breath.    Cardiovascular:  Negative for chest pain and palpitations.   Gastrointestinal:  Negative for abdominal pain and vomiting.   Genitourinary:  Negative for dysuria and hematuria.   Musculoskeletal:  Negative for arthralgias and back pain.   Skin:  Negative for color change  "and rash.   Neurological:  Negative for seizures and syncope.   All other systems reviewed and are negative.     Past Medical History:     History reviewed. No pertinent past medical history.   Past Surgical History:     History reviewed. No pertinent surgical history.   Family History:     History reviewed. No pertinent family history.   Social History:     Social History     Socioeconomic History    Marital status: /Civil Union     Spouse name: None    Number of children: None    Years of education: None    Highest education level: None   Occupational History    None   Tobacco Use    Smoking status: Never    Smokeless tobacco: Never   Vaping Use    Vaping status: Never Used   Substance and Sexual Activity    Alcohol use: Never    Drug use: Never    Sexual activity: None   Other Topics Concern    None   Social History Narrative    None     Social Determinants of Health     Financial Resource Strain: Not on file   Food Insecurity: Not on file   Transportation Needs: Not on file   Physical Activity: Not on file   Stress: Not on file   Social Connections: Not on file   Intimate Partner Violence: Not on file   Housing Stability: Not on file      Current Medications:     Current Outpatient Medications   Medication Sig Dispense Refill    cholecalciferol (VITAMIN D3) 1,000 units tablet Take 4,000 Units by mouth daily      cyanocobalamin 1,000 mcg/mL Inject 1 mL (1,000 mcg total) into a muscle every 14 (fourteen) days 10 mL 1    SYRINGE-NEEDLE, DISP, 3 ML (Luer Lock Safety Syringes) 25G X 5/8\" 3 ML MISC Use every 14 (fourteen) days 50 each 0     No current facility-administered medications for this visit.      Allergies:     No Known Allergies   Physical Exam:     /73 (BP Location: Right arm, Patient Position: Sitting, Cuff Size: Standard)   Pulse 95   Temp 97.6 °F (36.4 °C)   Ht 5' 8\" (1.727 m)   Wt 83.5 kg (184 lb)   SpO2 99%   BMI 27.98 kg/m²     Physical Exam  Vitals and nursing note reviewed. "   Constitutional:       General: He is not in acute distress.     Appearance: He is well-developed.   HENT:      Head: Normocephalic and atraumatic.   Eyes:      Conjunctiva/sclera: Conjunctivae normal.   Cardiovascular:      Rate and Rhythm: Normal rate and regular rhythm.      Heart sounds: No murmur heard.  Pulmonary:      Effort: Pulmonary effort is normal. No respiratory distress.      Breath sounds: Normal breath sounds.   Abdominal:      Palpations: Abdomen is soft.      Tenderness: There is no abdominal tenderness.   Musculoskeletal:         General: No swelling.      Cervical back: Neck supple.   Skin:     General: Skin is warm and dry.      Capillary Refill: Capillary refill takes less than 2 seconds.   Neurological:      Mental Status: He is alert.   Psychiatric:         Mood and Affect: Mood normal.        Recent Results (from the past 1344 hour(s))   CBC and differential    Collection Time: 02/05/24  7:05 AM   Result Value Ref Range    White Blood Cell Count 7.1 3.4 - 10.8 x10E3/uL    Red Blood Cell Count 5.12 4.14 - 5.80 x10E6/uL    Hemoglobin 15.1 13.0 - 17.7 g/dL    HCT 44.7 37.5 - 51.0 %    MCV 87 79 - 97 fL    MCH 29.5 26.6 - 33.0 pg    MCHC 33.8 31.5 - 35.7 g/dL    RDW 12.3 11.6 - 15.4 %    Platelet Count 311 150 - 450 x10E3/uL    Neutrophils 54 Not Estab. %    Lymphocytes 34 Not Estab. %    Monocytes 7 Not Estab. %    Eosinophils 3 Not Estab. %    Basophils PCT 1 Not Estab. %    Neutrophils (Absolute) 3.9 1.4 - 7.0 x10E3/uL    Lymphocytes (Absolute) 2.4 0.7 - 3.1 x10E3/uL    Monocytes (Absolute) 0.5 0.1 - 0.9 x10E3/uL    Eosinophils (Absolute) 0.2 0.0 - 0.4 x10E3/uL    Basophils ABS 0.1 0.0 - 0.2 x10E3/uL    Immature Granulocytes 1 Not Estab. %    Immature Granulocytes (Absolute) 0.0 0.0 - 0.1 x10E3/uL   Comprehensive metabolic panel    Collection Time: 02/05/24  7:05 AM   Result Value Ref Range    Glucose, Random 99 70 - 99 mg/dL    BUN 10 6 - 24 mg/dL    Creatinine 1.09 0.76 - 1.27 mg/dL     eGFR 78 >59 mL/min/1.73    SL AMB BUN/CREATININE RATIO 9 9 - 20    Sodium 141 134 - 144 mmol/L    Potassium 4.6 3.5 - 5.2 mmol/L    Chloride 103 96 - 106 mmol/L    CO2 26 20 - 29 mmol/L    CALCIUM 9.6 8.7 - 10.2 mg/dL    Protein, Total 6.8 6.0 - 8.5 g/dL    Albumin 4.5 3.8 - 4.9 g/dL    Globulin, Total 2.3 1.5 - 4.5 g/dL    Albumin/Globulin Ratio 2.0 1.2 - 2.2    TOTAL BILIRUBIN 0.5 0.0 - 1.2 mg/dL    Alk Phos Isoenzymes 99 44 - 121 IU/L    AST 16 0 - 40 IU/L    ALT 12 0 - 44 IU/L   UA/M w/rflx Culture, Routine    Collection Time: 02/05/24  7:05 AM   Result Value Ref Range    Specific Gravity 1.017 1.005 - 1.030    Ph 5.5 5.0 - 7.5    Color UA Yellow Yellow    Urine Appearance Clear Clear    Leukocyte Esterase Negative Negative    Protein Negative Negative/Trace    Glucose, 24 HR Urine Negative Negative    Ketone, Urine Negative Negative    Blood, Urine Negative Negative    Bilirubin, Urine Negative Negative    Urobilinogen Urine 0.2 0.2 - 1.0 mg/dL    Nitrites Urine Negative Negative    Microscopic Examination Comment     Microscopic Examination See below:     Urinalysis Reflex Comment    Microscopic Examination    Collection Time: 02/05/24  7:05 AM   Result Value Ref Range    SL AMB WBC, URINE None seen 0 - 5 /hpf    RBC, Urine None seen 0 - 2 /hpf    Epithelial Cells (non renal) None seen 0 - 10 /hpf    Casts None seen None seen /lpf    Bacteria, Urine None seen None seen/Few   Lipid panel    Collection Time: 02/05/24  7:05 AM   Result Value Ref Range    Cholesterol, Total 204 (H) 100 - 199 mg/dL    Triglycerides 189 (H) 0 - 149 mg/dL    HDL 46 >39 mg/dL    VLDL Cholesterol Calculated 33 5 - 40 mg/dL    LDL Calculated 125 (H) 0 - 99 mg/dL   Hemoglobin A1c (w/out EAG)    Collection Time: 02/05/24  7:05 AM   Result Value Ref Range    Hemoglobin A1C 5.9 (H) 4.8 - 5.6 %   PSA Total, Diagnostic    Collection Time: 02/05/24  7:05 AM   Result Value Ref Range    Prostate Specific Antigen Total 0.6 0.0 - 4.0 ng/mL    Vitamin D 25 hydroxy    Collection Time: 02/05/24  7:05 AM   Result Value Ref Range    25-HYDROXY VIT D 31.6 30.0 - 100.0 ng/mL   TSH, 3rd generation with Free T4 reflex    Collection Time: 02/05/24  7:05 AM   Result Value Ref Range    TSH 3.390 0.450 - 4.500 uIU/mL   Vitamin B12    Collection Time: 02/05/24  7:05 AM   Result Value Ref Range    Vitamin B-12 589 232 - 1,245 pg/mL        Charlotte House MD  [facility]

## 2024-02-20 NOTE — ASSESSMENT & PLAN NOTE
Test lipid profile shows cholesterol is at 204 it was 221 triglycerides were 139 now it is 189 HDL is at 46 and LDL at 125 came down from 149 continue to monitor with diet follow-up with repeat level in few months

## 2024-02-21 PROBLEM — J20.8 ACUTE BRONCHITIS DUE TO OTHER SPECIFIED ORGANISMS: Status: RESOLVED | Noted: 2024-01-26 | Resolved: 2024-02-21

## 2024-06-14 LAB
25(OH)D3+25(OH)D2 SERPL-MCNC: 39.6 NG/ML (ref 30–100)
CHOLEST SERPL-MCNC: 212 MG/DL (ref 100–199)
HBA1C MFR BLD: 5.9 % (ref 4.8–5.6)
HDLC SERPL-MCNC: 40 MG/DL
LDLC SERPL CALC-MCNC: 133 MG/DL (ref 0–99)
SL AMB VLDL CHOLESTEROL CALC: 39 MG/DL (ref 5–40)
TRIGL SERPL-MCNC: 216 MG/DL (ref 0–149)
VIT B12 SERPL-MCNC: 639 PG/ML (ref 232–1245)

## 2024-06-14 NOTE — PROGRESS NOTES
Office Visit Note  24     Bakari Ham 59 y.o. male MRN: [id number]  : 1965    Assessment:     1. Dyslipidemia  Assessment & Plan:  Cholesterol level came up slightly higher than before at 212 it was 204 triglycerides 216 HDL 14 LDL at 133 emphasized regarding diet exercise lifestyle modification which she has been trying to follow but still the cholesterol coming up high.  Will try with diet 1 more time according to him he will work harder with diet and exercise with patient coming up high in 2025 he will go on medication.  Orders:  -     Lipid panel; Future  -     Lipid panel  2. B12 deficiency  Assessment & Plan:  Patient has B12 deficiency currently taking 1000 mcg of B12 every 2 weeks in the form of injection last B12 level came back at 639 will continue with the same for now.  Orders:  -     Vitamin B12; Future  -     Vitamin B12  3. Pre-diabetes  -     Comprehensive metabolic panel; Future  -     Hemoglobin A1C; Future; Expected date: 2024  -     UA w Reflex to Microscopic w Reflex to Culture; Future; Expected date: 2024  -     Comprehensive metabolic panel  -     Hemoglobin A1C  4. Prediabetes  Assessment & Plan:  Lab reveals his A1c is still same as before at 5.9 again emphasized regarding diet exercise lifestyle modification cutting back on sweet intake carbohydrate intake  5. Dyspepsia  6. Screening PSA (prostate specific antigen)  -     PSA, Total Screen; Future  7. Screening for thyroid disorder  -     TSH, 3rd generation with Free T4 reflex; Future; Expected date: 2024  -     TSH, 3rd generation with Free T4 reflex  8. Screening for deficiency anemia  -     CBC and differential; Future  -     CBC and differential  9. Screening for colon cancer  -     Cologuard             Discussion Summary and Plan:  Today's care plan and medications were reviewed with patient in detail and all their questions answered to their satisfaction.    Chief Complaint   Patient  presents with   • Follow-up      Subjective:  Patient is coming here for a follow-up evaluation with regards to symptoms of with a history of B12 deficiency and also history of hypercholesterolemia patient has been trying very hard with exercise and diet to bring his cholesterol down and reviewed the last 2 years reports with the cholesterol that had been fluctuating the latest when she was 212 with an elevated triglyceride levels.  Currently not on any medications he is on B12 supplements in the form of injection history of B12 deficiency.        The following portions of the patient's history were reviewed and updated as appropriate: allergies, current medications, past family history, past medical history, past social history, past surgical history and problem list.    Review of Systems   Constitutional:  Negative for chills and fever.   HENT:  Negative for ear pain and sore throat.    Eyes:  Negative for pain and visual disturbance.   Respiratory:  Negative for cough and shortness of breath.    Cardiovascular:  Negative for chest pain and palpitations.   Gastrointestinal:  Negative for abdominal pain and vomiting.   Genitourinary:  Negative for dysuria and hematuria.   Musculoskeletal:  Negative for arthralgias and back pain.   Skin:  Negative for color change and rash.   Neurological:  Negative for seizures and syncope.   All other systems reviewed and are negative.        Historical Information   Patient Active Problem List   Diagnosis   • B12 deficiency   • Dyslipidemia   • Prediabetes   • Dyspepsia   • Atypical chest pain     History reviewed. No pertinent past medical history.  History reviewed. No pertinent surgical history.  Social History     Substance and Sexual Activity   Alcohol Use Never     Social History     Substance and Sexual Activity   Drug Use Never     Social History     Tobacco Use   Smoking Status Never   Smokeless Tobacco Never     History reviewed. No pertinent family history.  Health  "Maintenance Due   Topic   • Hepatitis C Screening    • HIV Screening    • DTaP,Tdap,and Td Vaccines (1 - Tdap)   • Colorectal Cancer Screening    • Zoster Vaccine (1 of 2)   • COVID-19 Vaccine (3 - 2023-24 season)   • RSV Vaccine Age 60+ Years (1 - 1-dose 60+ series)      Meds/Allergies       Current Outpatient Medications:   •  cholecalciferol (VITAMIN D3) 1,000 units tablet, Take 4,000 Units by mouth daily, Disp: , Rfl:   •  cyanocobalamin 1,000 mcg/mL, Inject 1 mL (1,000 mcg total) into a muscle every 14 (fourteen) days, Disp: 10 mL, Rfl: 1  •  SYRINGE-NEEDLE, DISP, 3 ML (Luer Lock Safety Syringes) 25G X 5/8\" 3 ML MISC, Use every 14 (fourteen) days, Disp: 50 each, Rfl: 0      Objective:    Vitals:   /72 (BP Location: Right arm, Patient Position: Sitting, Cuff Size: Standard)   Ht 5' 8\" (1.727 m)   Wt 83.2 kg (183 lb 6.4 oz)   BMI 27.89 kg/m²   Body mass index is 27.89 kg/m².  Vitals:    06/17/24 1107   Weight: 83.2 kg (183 lb 6.4 oz)       Physical Exam  Vitals and nursing note reviewed.   Constitutional:       Appearance: Normal appearance.   Cardiovascular:      Rate and Rhythm: Normal rate and regular rhythm.      Heart sounds: Normal heart sounds.   Pulmonary:      Effort: Pulmonary effort is normal.      Breath sounds: Normal breath sounds.   Abdominal:      Palpations: Abdomen is soft.   Musculoskeletal:      Right lower leg: No edema.      Left lower leg: No edema.   Skin:     General: Skin is dry.   Neurological:      Mental Status: He is alert and oriented to person, place, and time.   Psychiatric:         Mood and Affect: Mood normal.         Behavior: Behavior normal.         Lab Review   Orders Only on 06/13/2024   Component Date Value Ref Range Status   • Cholesterol, Total 06/13/2024 212 (H)  100 - 199 mg/dL Final   • Triglycerides 06/13/2024 216 (H)  0 - 149 mg/dL Final   • HDL 06/13/2024 40  >39 mg/dL Final   • VLDL Cholesterol Calculated 06/13/2024 39  5 - 40 mg/dL Final   • LDL " "Calculated 06/13/2024 133 (H)  0 - 99 mg/dL Final   • Hemoglobin A1C 06/13/2024 5.9 (H)  4.8 - 5.6 % Final    Comment:          Prediabetes: 5.7 - 6.4           Diabetes: >6.4           Glycemic control for adults with diabetes: <7.0     • 25-HYDROXY VIT D 06/13/2024 39.6  30.0 - 100.0 ng/mL Final    Comment: Vitamin D deficiency has been defined by the [place] [employer]  [employer] and an Endocrine Society practice guideline as a  level of serum 25-OH vitamin D less than 20 ng/mL (1,2).  The Endocrine Society went on to further define vitamin D  insufficiency as a level between 21 and 29 ng/mL (2).  1. IOM ([place] [employer]). 2010. Dietary reference     intakes for calcium and D. [place]: The     National Academies Press.  2. Camelia MF, Ashly ORTEGA, Mojgan MCCOY, et al.     Evaluation, treatment, and prevention of vitamin D     deficiency: an Endocrine Society clinical practice     guideline. JCEM. 2011 Jul; 96(7):1911-30.     • Vitamin B-12 06/13/2024 639  232 - 1,245 pg/mL Final         Charlotte House MD        \"This note has been constructed using a voice recognition system.Therefore there may be syntax, spelling, and/or grammatical errors. Please call if you have any questions. \"  "

## 2024-06-17 ENCOUNTER — OFFICE VISIT (OUTPATIENT)
Dept: FAMILY MEDICINE CLINIC | Facility: CLINIC | Age: 59
End: 2024-06-17
Payer: COMMERCIAL

## 2024-06-17 VITALS
BODY MASS INDEX: 27.8 KG/M2 | DIASTOLIC BLOOD PRESSURE: 72 MMHG | WEIGHT: 183.4 LBS | HEIGHT: 68 IN | SYSTOLIC BLOOD PRESSURE: 130 MMHG

## 2024-06-17 DIAGNOSIS — R73.03 PREDIABETES: ICD-10-CM

## 2024-06-17 DIAGNOSIS — Z13.0 SCREENING FOR DEFICIENCY ANEMIA: ICD-10-CM

## 2024-06-17 DIAGNOSIS — Z13.29 SCREENING FOR THYROID DISORDER: ICD-10-CM

## 2024-06-17 DIAGNOSIS — E78.5 DYSLIPIDEMIA: Primary | ICD-10-CM

## 2024-06-17 DIAGNOSIS — Z12.11 SCREENING FOR COLON CANCER: ICD-10-CM

## 2024-06-17 DIAGNOSIS — R10.13 DYSPEPSIA: ICD-10-CM

## 2024-06-17 DIAGNOSIS — R73.03 PRE-DIABETES: ICD-10-CM

## 2024-06-17 DIAGNOSIS — E53.8 B12 DEFICIENCY: ICD-10-CM

## 2024-06-17 DIAGNOSIS — Z12.5 SCREENING PSA (PROSTATE SPECIFIC ANTIGEN): ICD-10-CM

## 2024-06-17 PROCEDURE — 99214 OFFICE O/P EST MOD 30 MIN: CPT | Performed by: INTERNAL MEDICINE

## 2024-06-17 NOTE — ASSESSMENT & PLAN NOTE
Patient has B12 deficiency currently taking 1000 mcg of B12 every 2 weeks in the form of injection last B12 level came back at 639 will continue with the same for now.

## 2024-06-17 NOTE — ASSESSMENT & PLAN NOTE
Cholesterol level came up slightly higher than before at 212 it was 204 triglycerides 216 HDL 14 LDL at 133 emphasized regarding diet exercise lifestyle modification which she has been trying to follow but still the cholesterol coming up high.  Will try with diet 1 more time according to him he will work harder with diet and exercise with patient coming up high in January 2025 he will go on medication.

## 2024-06-17 NOTE — ASSESSMENT & PLAN NOTE
Lab reveals his A1c is still same as before at 5.9 again emphasized regarding diet exercise lifestyle modification cutting back on sweet intake carbohydrate intake

## 2024-06-25 LAB — COLOGUARD RESULT REPORTABLE: NORMAL

## 2024-06-28 ENCOUNTER — TELEPHONE (OUTPATIENT)
Dept: FAMILY MEDICINE CLINIC | Facility: CLINIC | Age: 59
End: 2024-06-28

## 2024-07-05 LAB — COLOGUARD RESULT REPORTABLE: NEGATIVE

## 2024-08-25 DIAGNOSIS — E53.8 B12 DEFICIENCY: ICD-10-CM

## 2024-08-26 RX ORDER — CYANOCOBALAMIN 1000 UG/ML
1000 INJECTION, SOLUTION INTRAMUSCULAR; SUBCUTANEOUS
Qty: 10 ML | Refills: 1 | Status: SHIPPED | OUTPATIENT
Start: 2024-08-26

## 2025-02-13 ENCOUNTER — TELEPHONE (OUTPATIENT)
Dept: FAMILY MEDICINE CLINIC | Facility: CLINIC | Age: 60
End: 2025-02-13

## 2025-02-13 DIAGNOSIS — R73.03 PRE-DIABETES: Primary | ICD-10-CM

## 2025-02-13 DIAGNOSIS — E55.9 VITAMIN D DEFICIENCY: ICD-10-CM

## 2025-02-14 ENCOUNTER — RESULTS FOLLOW-UP (OUTPATIENT)
Dept: FAMILY MEDICINE CLINIC | Facility: CLINIC | Age: 60
End: 2025-02-14

## 2025-02-14 LAB
25(OH)D3+25(OH)D2 SERPL-MCNC: 43.1 NG/ML (ref 30–100)
ALBUMIN SERPL-MCNC: 4.4 G/DL (ref 3.8–4.9)
ALP SERPL-CCNC: 89 IU/L (ref 44–121)
ALT SERPL-CCNC: 11 IU/L (ref 0–44)
APPEARANCE UR: CLEAR
AST SERPL-CCNC: 18 IU/L (ref 0–40)
BACTERIA URNS QL MICRO: NORMAL
BASOPHILS # BLD AUTO: 0 X10E3/UL (ref 0–0.2)
BASOPHILS NFR BLD AUTO: 1 %
BILIRUB SERPL-MCNC: 0.6 MG/DL (ref 0–1.2)
BILIRUB UR QL STRIP: NEGATIVE
BUN SERPL-MCNC: 15 MG/DL (ref 8–27)
BUN/CREAT SERPL: 13 (ref 10–24)
CALCIUM SERPL-MCNC: 9.3 MG/DL (ref 8.6–10.2)
CASTS URNS QL MICRO: NORMAL /LPF
CHLORIDE SERPL-SCNC: 99 MMOL/L (ref 96–106)
CHOLEST SERPL-MCNC: 224 MG/DL (ref 100–199)
CHOLEST/HDLC SERPL: 4.6 RATIO (ref 0–5)
CO2 SERPL-SCNC: 26 MMOL/L (ref 20–29)
COLOR UR: YELLOW
CREAT SERPL-MCNC: 1.14 MG/DL (ref 0.76–1.27)
EGFR: 74 ML/MIN/1.73
EOSINOPHIL # BLD AUTO: 0.1 X10E3/UL (ref 0–0.4)
EOSINOPHIL NFR BLD AUTO: 2 %
EPI CELLS #/AREA URNS HPF: NORMAL /HPF (ref 0–10)
ERYTHROCYTE [DISTWIDTH] IN BLOOD BY AUTOMATED COUNT: 12.3 % (ref 11.6–15.4)
EST. AVERAGE GLUCOSE BLD GHB EST-MCNC: 114 MG/DL
GLOBULIN SER-MCNC: 2.2 G/DL (ref 1.5–4.5)
GLUCOSE SERPL-MCNC: 92 MG/DL (ref 70–99)
GLUCOSE UR QL: NEGATIVE
HBA1C MFR BLD: 5.6 % (ref 4.8–5.6)
HCT VFR BLD AUTO: 43.2 % (ref 37.5–51)
HDLC SERPL-MCNC: 49 MG/DL
HGB BLD-MCNC: 14.2 G/DL (ref 13–17.7)
HGB UR QL STRIP: NEGATIVE
IMM GRANULOCYTES # BLD: 0 X10E3/UL (ref 0–0.1)
IMM GRANULOCYTES NFR BLD: 0 %
KETONES UR QL STRIP: NEGATIVE
LDLC SERPL CALC-MCNC: 148 MG/DL (ref 0–99)
LEUKOCYTE ESTERASE UR QL STRIP: NEGATIVE
LYMPHOCYTES # BLD AUTO: 1.4 X10E3/UL (ref 0.7–3.1)
LYMPHOCYTES NFR BLD AUTO: 27 %
MCH RBC QN AUTO: 29.3 PG (ref 26.6–33)
MCHC RBC AUTO-ENTMCNC: 32.9 G/DL (ref 31.5–35.7)
MCV RBC AUTO: 89 FL (ref 79–97)
MICRO URNS: NORMAL
MICRO URNS: NORMAL
MONOCYTES # BLD AUTO: 0.3 X10E3/UL (ref 0.1–0.9)
MONOCYTES NFR BLD AUTO: 6 %
NEUTROPHILS # BLD AUTO: 3.2 X10E3/UL (ref 1.4–7)
NEUTROPHILS NFR BLD AUTO: 64 %
NITRITE UR QL STRIP: NEGATIVE
PH UR STRIP: 7.5 [PH] (ref 5–7.5)
PLATELET # BLD AUTO: 237 X10E3/UL (ref 150–450)
POTASSIUM SERPL-SCNC: 4.5 MMOL/L (ref 3.5–5.2)
PROT SERPL-MCNC: 6.6 G/DL (ref 6–8.5)
PROT UR QL STRIP: NEGATIVE
RBC # BLD AUTO: 4.84 X10E6/UL (ref 4.14–5.8)
RBC #/AREA URNS HPF: NORMAL /HPF (ref 0–2)
SL AMB VLDL CHOLESTEROL CALC: 27 MG/DL (ref 5–40)
SODIUM SERPL-SCNC: 137 MMOL/L (ref 134–144)
SP GR UR: 1.01 (ref 1–1.03)
TRIGL SERPL-MCNC: 149 MG/DL (ref 0–149)
TSH SERPL DL<=0.005 MIU/L-ACNC: 2.73 UIU/ML (ref 0.45–4.5)
UROBILINOGEN UR STRIP-ACNC: 0.2 MG/DL (ref 0.2–1)
VIT B12 SERPL-MCNC: 497 PG/ML (ref 232–1245)
WBC # BLD AUTO: 5.1 X10E3/UL (ref 3.4–10.8)
WBC #/AREA URNS HPF: NORMAL /HPF (ref 0–5)

## 2025-02-21 NOTE — PROGRESS NOTES
"Adult Annual Physical  Name: Bakari Ham      : 1965      MRN: [id number]  Encounter Provider: Charlotte House MD  Encounter Date: 2025   Encounter department: [facility] [place]    Assessment & Plan  Dyslipidemia  Patient's lipid profile had shown cholesterol at 224 triglycerides 149 HDL 49 and LDL at 148 patient has been consistently showing elevated cholesterol levels and elevated LDL levels we will do a calcium score coronary for further evaluation.  Orders:  •  Lipid panel; Future    B12 deficiency  Patient with a history of B12 deficiency currently taking 1000 mcg of B12 every month the last level came back at 497 we will continue with the same for now.  Orders:  •  cyanocobalamin 1,000 mcg/mL; Inject 1 mL (1,000 mcg total) into a muscle every 14 (fourteen) days  •  SYRINGE-NEEDLE, DISP, 3 ML (Luer Lock Safety Syringes) 25G X 5/8\" 3 ML MISC; Use every 14 (fourteen) days    Prediabetes  A1c level actually has come down to 5.6 from 5.9 which is good rest of the labs are all unremarkable including the thyroid function test etc. vitamin D level also came back normal currently is taking on average  at units daily  Orders:  •  Comprehensive metabolic panel; Future    Coronary artery disease due to lipid rich plaque    Orders:  •  CT coronary calcium score; Future    Annual physical exam         Screening PSA (prostate specific antigen)    Orders:  •  PSA Total (Reflex To Free); Future    Primary hypertension  Patient has been noticing some fluctuation in the blood pressure readings at home sometimes going into 145 today here blood pressure initially 128/74 subsequent reading 134/84 I recommended patient to check the blood pressures at home and write down the readings and bring the machine along with the readings next time so that we can compare meanwhile patient is following low-salt diet.       Immunizations and preventive care screenings were discussed with patient today. " Appropriate education was printed on patient's after visit summary.    Discussed risks and benefits of prostate cancer screening. We discussed the controversial history of PSA screening for prostate cancer in the [place] as well as the risk of over detection and over treatment of prostate cancer by way of PSA screening.  The patient understands that PSA blood testing is an imperfect way to screen for prostate cancer and that elevated PSA levels in the blood may also be caused by infection, inflammation, prostatic trauma or manipulation, urological procedures, or by benign prostatic enlargement.    The role of the digital rectal examination in prostate cancer screening was also discussed and I discussed with him that there is large interobserver variability in the findings of digital rectal examination.    Counseling:  Alcohol/drug use: discussed moderation in alcohol intake, the recommendations for healthy alcohol use, and avoidance of illicit drug use.  Dental Health: discussed importance of regular tooth brushing, flossing, and dental visits.  Injury prevention: discussed safety/seat belts, safety helmets, smoke detectors, carbon monoxide detectors, and smoking near bedding or upholstery.  Sexual health: discussed sexually transmitted diseases, partner selection, use of condoms, avoidance of unintended pregnancy, and contraceptive alternatives.  Exercise: the importance of regular exercise/physical activity was discussed. Recommend exercise 3-5 times per week for at least 30 minutes.         Depression Screening and Follow-up Plan: Patient was screened for depression during today's encounter. They screened negative with a PHQ-2 score of 0.          History of Present Illness     Adult Annual Physical:  Patient presents for annual physical.     Diet and Physical Activity:  - Diet/Nutrition: well balanced diet.  - Exercise: moderate cardiovascular exercise, 30-60 minutes on average and 5-7 times a week on  "average.    Depression Screening:  - PHQ-2 Score: 0  - PHQ-9 Score: 0    General Health:  - Sleep: sleeps well and 7-8 hours of sleep on average.  - Hearing: normal hearing bilateral ears.  - Vision: no vision problems and most recent eye exam > 1 year ago.  - Dental: regular dental visits and brushes teeth twice daily. pt flosses     Health:  - History of STDs: no.   - Urinary symptoms: none.     Advanced Care Planning:  - Has an advanced directive?: no    - Has a durable medical POA?: no    - ACP document given to patient?: yes      Review of Systems   Constitutional:  Negative for chills and fever.   HENT:  Negative for ear pain and sore throat.    Eyes:  Negative for pain and visual disturbance.   Respiratory:  Negative for cough and shortness of breath.    Cardiovascular:  Negative for chest pain and palpitations.   Gastrointestinal:  Negative for abdominal pain and vomiting.   Genitourinary:  Negative for dysuria and hematuria.   Musculoskeletal:  Negative for arthralgias and back pain.   Skin:  Negative for color change and rash.   Neurological:  Negative for seizures and syncope.   All other systems reviewed and are negative.        Objective   /74 (BP Location: Right arm, Patient Position: Sitting, Cuff Size: Standard)   Pulse 71   Ht 5' 8\" (1.727 m)   Wt 81.6 kg (179 lb 12.8 oz)   SpO2 100%   BMI 27.34 kg/m²     Physical Exam  Vitals and nursing note reviewed.   Constitutional:       General: He is not in acute distress.     Appearance: He is well-developed.   HENT:      Head: Normocephalic and atraumatic.   Eyes:      Conjunctiva/sclera: Conjunctivae normal.   Cardiovascular:      Rate and Rhythm: Normal rate and regular rhythm.      Heart sounds: No murmur heard.  Pulmonary:      Effort: Pulmonary effort is normal. No respiratory distress.      Breath sounds: Normal breath sounds.   Abdominal:      Palpations: Abdomen is soft.      Tenderness: There is no abdominal tenderness. "   Genitourinary:     Prostate: Normal.      Rectum: Normal.   Musculoskeletal:         General: No swelling.      Cervical back: Neck supple.   Skin:     General: Skin is warm and dry.      Capillary Refill: Capillary refill takes less than 2 seconds.   Neurological:      Mental Status: He is alert and oriented to person, place, and time.   Psychiatric:         Mood and Affect: Mood normal.         Behavior: Behavior normal.       Recent Results (from the past 8 weeks)   Vitamin B12    Collection Time: 02/13/25  8:50 AM   Result Value Ref Range    Vitamin B-12 497 232 - 1,245 pg/mL   CBC and differential    Collection Time: 02/13/25  8:50 AM   Result Value Ref Range    White Blood Cell Count 5.1 3.4 - 10.8 x10E3/uL    Red Blood Cell Count 4.84 4.14 - 5.80 x10E6/uL    Hemoglobin 14.2 13.0 - 17.7 g/dL    HCT 43.2 37.5 - 51.0 %    MCV 89 79 - 97 fL    MCH 29.3 26.6 - 33.0 pg    MCHC 32.9 31.5 - 35.7 g/dL    RDW 12.3 11.6 - 15.4 %    Platelet Count 237 150 - 450 x10E3/uL    Neutrophils 64 Not Estab. %    Lymphocytes 27 Not Estab. %    Monocytes 6 Not Estab. %    Eosinophils 2 Not Estab. %    Basophils PCT 1 Not Estab. %    Neutrophils (Absolute) 3.2 1.4 - 7.0 x10E3/uL    Lymphocytes (Absolute) 1.4 0.7 - 3.1 x10E3/uL    Monocytes (Absolute) 0.3 0.1 - 0.9 x10E3/uL    Eosinophils (Absolute) 0.1 0.0 - 0.4 x10E3/uL    Basophils ABS 0.0 0.0 - 0.2 x10E3/uL    Immature Granulocytes 0 Not Estab. %    Immature Granulocytes (Absolute) 0.0 0.0 - 0.1 x10E3/uL   Comprehensive metabolic panel    Collection Time: 02/13/25  8:50 AM   Result Value Ref Range    Glucose, Random 92 70 - 99 mg/dL    BUN 15 8 - 27 mg/dL    Creatinine 1.14 0.76 - 1.27 mg/dL    eGFR 74 >59 mL/min/1.73    SL AMB BUN/CREATININE RATIO 13 10 - 24    Sodium 137 134 - 144 mmol/L    Potassium 4.5 3.5 - 5.2 mmol/L    Chloride 99 96 - 106 mmol/L    CO2 26 20 - 29 mmol/L    CALCIUM 9.3 8.6 - 10.2 mg/dL    Protein, Total 6.6 6.0 - 8.5 g/dL    Albumin 4.4 3.8 - 4.9 g/dL     Globulin, Total 2.2 1.5 - 4.5 g/dL    TOTAL BILIRUBIN 0.6 0.0 - 1.2 mg/dL    Alk Phos Isoenzymes 89 44 - 121 IU/L    AST 18 0 - 40 IU/L    ALT 11 0 - 44 IU/L   Hemoglobin A1C    Collection Time: 02/13/25  8:50 AM   Result Value Ref Range    Hemoglobin A1C 5.6 4.8 - 5.6 %    Estimated Average Glucose 114 mg/dL   Lipid panel    Collection Time: 02/13/25  8:50 AM   Result Value Ref Range    Cholesterol, Total 224 (H) 100 - 199 mg/dL    Triglycerides 149 0 - 149 mg/dL    HDL 49 >39 mg/dL    VLDL Cholesterol Calculated 27 5 - 40 mg/dL    LDL Calculated 148 (H) 0 - 99 mg/dL    T. Chol/HDL Ratio 4.6 0.0 - 5.0 ratio   TSH, 3rd generation with Free T4 reflex    Collection Time: 02/13/25  8:50 AM   Result Value Ref Range    TSH 2.730 0.450 - 4.500 uIU/mL   Urinalysis with microscopic    Collection Time: 02/13/25  8:50 AM   Result Value Ref Range    Specific Gravity 1.009 1.005 - 1.030    Ph 7.5 5.0 - 7.5    Color UA Yellow Yellow    Urine Appearance Clear Clear    Leukocyte Esterase Negative Negative    Protein Negative Negative/Trace    Glucose, 24 HR Urine Negative Negative    Ketone, Urine Negative Negative    Blood, Urine Negative Negative    Bilirubin, Urine Negative Negative    Urobilinogen Urine 0.2 0.2 - 1.0 mg/dL    Nitrites Urine Negative Negative    Microscopic Examination Comment     Microscopic Examination See below:    Microscopic Examination    Collection Time: 02/13/25  8:50 AM   Result Value Ref Range    SL AMB WBC, URINE None seen 0 - 5 /hpf    RBC, Urine None seen 0 - 2 /hpf    Epithelial Cells (non renal) None seen 0 - 10 /hpf    Casts None seen None seen /lpf    Bacteria, Urine None seen None seen/Few   Vitamin D 25 hydroxy    Collection Time: 02/13/25  8:50 AM   Result Value Ref Range    25-HYDROXY VIT D 43.1 30.0 - 100.0 ng/mL

## 2025-02-24 ENCOUNTER — OFFICE VISIT (OUTPATIENT)
Dept: FAMILY MEDICINE CLINIC | Facility: CLINIC | Age: 60
End: 2025-02-24
Payer: COMMERCIAL

## 2025-02-24 VITALS
WEIGHT: 179.8 LBS | HEIGHT: 68 IN | SYSTOLIC BLOOD PRESSURE: 128 MMHG | HEART RATE: 71 BPM | BODY MASS INDEX: 27.25 KG/M2 | DIASTOLIC BLOOD PRESSURE: 74 MMHG | OXYGEN SATURATION: 100 %

## 2025-02-24 DIAGNOSIS — I25.83 CORONARY ARTERY DISEASE DUE TO LIPID RICH PLAQUE: ICD-10-CM

## 2025-02-24 DIAGNOSIS — E53.8 B12 DEFICIENCY: ICD-10-CM

## 2025-02-24 DIAGNOSIS — Z12.5 SCREENING PSA (PROSTATE SPECIFIC ANTIGEN): ICD-10-CM

## 2025-02-24 DIAGNOSIS — R73.03 PREDIABETES: ICD-10-CM

## 2025-02-24 DIAGNOSIS — E78.5 DYSLIPIDEMIA: Primary | ICD-10-CM

## 2025-02-24 DIAGNOSIS — I10 PRIMARY HYPERTENSION: ICD-10-CM

## 2025-02-24 DIAGNOSIS — I25.10 CORONARY ARTERY DISEASE DUE TO LIPID RICH PLAQUE: ICD-10-CM

## 2025-02-24 DIAGNOSIS — Z00.00 ANNUAL PHYSICAL EXAM: ICD-10-CM

## 2025-02-24 PROCEDURE — 99396 PREV VISIT EST AGE 40-64: CPT | Performed by: INTERNAL MEDICINE

## 2025-02-24 PROCEDURE — 99214 OFFICE O/P EST MOD 30 MIN: CPT | Performed by: INTERNAL MEDICINE

## 2025-02-24 RX ORDER — CYANOCOBALAMIN 1000 UG/ML
1000 INJECTION, SOLUTION INTRAMUSCULAR; SUBCUTANEOUS
Qty: 10 ML | Refills: 1 | Status: SHIPPED | OUTPATIENT
Start: 2025-02-24

## 2025-02-24 NOTE — ASSESSMENT & PLAN NOTE
"Patient with a history of B12 deficiency currently taking 1000 mcg of B12 every month the last level came back at 497 we will continue with the same for now.  Orders:  •  cyanocobalamin 1,000 mcg/mL; Inject 1 mL (1,000 mcg total) into a muscle every 14 (fourteen) days  •  SYRINGE-NEEDLE, DISP, 3 ML (Luer Lock Safety Syringes) 25G X 5/8\" 3 ML MISC; Use every 14 (fourteen) days  "

## 2025-02-24 NOTE — ASSESSMENT & PLAN NOTE
A1c level actually has come down to 5.6 from 5.9 which is good rest of the labs are all unremarkable including the thyroid function test etc. vitamin D level also came back normal currently is taking on average 2005 at units daily  Orders:  •  Comprehensive metabolic panel; Future

## 2025-02-24 NOTE — ASSESSMENT & PLAN NOTE
Patient has been noticing some fluctuation in the blood pressure readings at home sometimes going into 145 today here blood pressure initially 128/74 subsequent reading 134/84 I recommended patient to check the blood pressures at home and write down the readings and bring the machine along with the readings next time so that we can compare meanwhile patient is following low-salt diet.

## 2025-02-24 NOTE — ASSESSMENT & PLAN NOTE
Patient's lipid profile had shown cholesterol at 224 triglycerides 149 HDL 49 and LDL at 148 patient has been consistently showing elevated cholesterol levels and elevated LDL levels we will do a calcium score coronary for further evaluation.  Orders:  •  Lipid panel; Future

## 2025-03-15 ENCOUNTER — HOSPITAL ENCOUNTER (OUTPATIENT)
Dept: CT IMAGING | Facility: HOSPITAL | Age: 60
Discharge: HOME | End: 2025-03-15
Payer: COMMERCIAL

## 2025-03-15 DIAGNOSIS — I25.10 CORONARY ARTERY DISEASE DUE TO LIPID RICH PLAQUE: ICD-10-CM

## 2025-03-15 DIAGNOSIS — I25.83 CORONARY ARTERY DISEASE DUE TO LIPID RICH PLAQUE: ICD-10-CM

## 2025-03-15 PROCEDURE — 75571 CT HRT W/O DYE W/CA TEST: CPT

## 2025-03-23 ENCOUNTER — RESULTS FOLLOW-UP (OUTPATIENT)
Dept: FAMILY MEDICINE CLINIC | Facility: CLINIC | Age: 60
End: 2025-03-23

## 2025-06-10 LAB
ALBUMIN SERPL-MCNC: 4.3 G/DL (ref 3.9–4.9)
ALP SERPL-CCNC: 92 IU/L (ref 44–121)
ALT SERPL-CCNC: 11 IU/L (ref 0–44)
AST SERPL-CCNC: 14 IU/L (ref 0–40)
BILIRUB SERPL-MCNC: 0.7 MG/DL (ref 0–1.2)
BUN SERPL-MCNC: 13 MG/DL (ref 8–27)
BUN/CREAT SERPL: 12 (ref 10–24)
CALCIUM SERPL-MCNC: 9.5 MG/DL (ref 8.6–10.2)
CHLORIDE SERPL-SCNC: 104 MMOL/L (ref 96–106)
CHOLEST SERPL-MCNC: 214 MG/DL (ref 100–199)
CHOLEST/HDLC SERPL: 4 RATIO (ref 0–5)
CO2 SERPL-SCNC: 21 MMOL/L (ref 20–29)
CREAT SERPL-MCNC: 1.06 MG/DL (ref 0.76–1.27)
EGFR: 80 ML/MIN/1.73
GLOBULIN SER-MCNC: 2.3 G/DL (ref 1.5–4.5)
GLUCOSE SERPL-MCNC: 87 MG/DL (ref 70–99)
HDLC SERPL-MCNC: 54 MG/DL
LDLC SERPL CALC-MCNC: 141 MG/DL (ref 0–99)
POTASSIUM SERPL-SCNC: 4.7 MMOL/L (ref 3.5–5.2)
PROT SERPL-MCNC: 6.6 G/DL (ref 6–8.5)
PSA SERPL-MCNC: 0.5 NG/ML (ref 0–4)
SL AMB REFLEX CRITERIA: NORMAL
SL AMB VLDL CHOLESTEROL CALC: 19 MG/DL (ref 5–40)
SODIUM SERPL-SCNC: 143 MMOL/L (ref 134–144)
TRIGL SERPL-MCNC: 106 MG/DL (ref 0–149)

## 2025-07-17 ENCOUNTER — OFFICE VISIT (OUTPATIENT)
Age: 60
End: 2025-07-17
Payer: COMMERCIAL

## 2025-07-17 VITALS
DIASTOLIC BLOOD PRESSURE: 72 MMHG | SYSTOLIC BLOOD PRESSURE: 123 MMHG | HEART RATE: 74 BPM | OXYGEN SATURATION: 98 % | BODY MASS INDEX: 25.25 KG/M2 | HEIGHT: 68 IN | WEIGHT: 166.6 LBS

## 2025-07-17 DIAGNOSIS — Z12.5 SCREENING PSA (PROSTATE SPECIFIC ANTIGEN): ICD-10-CM

## 2025-07-17 DIAGNOSIS — R73.03 PRE-DIABETES: ICD-10-CM

## 2025-07-17 DIAGNOSIS — R10.13 DYSPEPSIA: ICD-10-CM

## 2025-07-17 DIAGNOSIS — Z13.0 SCREENING FOR DEFICIENCY ANEMIA: ICD-10-CM

## 2025-07-17 DIAGNOSIS — N39.0 ACUTE UTI: ICD-10-CM

## 2025-07-17 DIAGNOSIS — R73.03 PREDIABETES: ICD-10-CM

## 2025-07-17 DIAGNOSIS — Z13.29 SCREENING FOR THYROID DISORDER: ICD-10-CM

## 2025-07-17 DIAGNOSIS — E53.8 B12 DEFICIENCY: Primary | ICD-10-CM

## 2025-07-17 DIAGNOSIS — E78.5 DYSLIPIDEMIA: ICD-10-CM

## 2025-07-17 DIAGNOSIS — I10 PRIMARY HYPERTENSION: ICD-10-CM

## 2025-07-17 PROCEDURE — 99214 OFFICE O/P EST MOD 30 MIN: CPT | Performed by: INTERNAL MEDICINE

## 2025-07-17 NOTE — ASSESSMENT & PLAN NOTE
Last A1c was 5.6 we will follow-up with repeat 1 in 6 months.  Orders:  •  Comprehensive metabolic panel; Future  •  Hemoglobin A1C; Future

## 2025-07-17 NOTE — ASSESSMENT & PLAN NOTE
Lipid profile done on June 9 is 214 it was 224 before triglycerides 106 it was 149 HDL is 54 LDL at 141.  Continue with diet exercise for now as mentioned above his calcium level score has come down to 0 which is good  Orders:  •  Lipid panel; Future

## 2025-07-17 NOTE — PROGRESS NOTES
Name: Bakari Ham      : 1965      MRN: [id number]  Encounter Provider: Charlotte House MD  Encounter Date: 2025   Encounter department: [facility]  :  Assessment & Plan  B12 deficiency  Continue B12 injections 1000 mcg every 2 weeks last level was 497 we will follow-up with repeat 1 in 6 months from now.  Orders:  •  Vitamin B12; Future    Primary hypertension  Blood pressure has been stable in the office 123/72 at home sometimes he is noticing higher readings may not be right we will continue to monitor with periodic checks.       Dyslipidemia  Lipid profile done on  is 214 it was 224 before triglycerides 106 it was 149 HDL is 54 LDL at 141.  Continue with diet exercise for now as mentioned above his calcium level score has come down to 0 which is good  Orders:  •  Lipid panel; Future    Dyspepsia  No more episodes of dyspepsia at present time we will monitor       Prediabetes  Last A1c was 5.6 we will follow-up with repeat 1 in 6 months.  Orders:  •  Comprehensive metabolic panel; Future  •  Hemoglobin A1C; Future    Screening PSA (prostate specific antigen)    Orders:  •  PSA, Total Screen; Future    Acute UTI    Orders:  •  UA/M w/rflx Culture, Comp; Future    Screening for thyroid disorder    Orders:  •  TSH, 3rd generation with Free T4 reflex; Future    Screening for deficiency anemia    Orders:  •  CBC and differential; Future    Pre-diabetes                History of Present Illness   Patient is coming here for a follow-up evaluation with a history of B12 deficiency on B12 injections 1000 mcg every 2 weeks.  Patient with hypercholesterolemia has been trying to follow a very strict diet and exercising walking about 7 miles a day there is a slight drop in the cholesterol levels and LDL levels.  Patient also had a calcium scoring test done which came back 0 which is good    Patient had mildly elevated blood pressure readings in the past his blood pressure  "machine at home has been showing higher readings however here his blood pressure is 123/72 and the second reading with a different blood pressure machine in the office is systolic 118.  Patient has also lost about 13 pounds in the last 4 months time.      Review of Systems   Constitutional:  Negative for chills and fever.   HENT:  Negative for ear pain and sore throat.    Eyes:  Negative for pain and visual disturbance.   Respiratory:  Negative for cough and shortness of breath.    Cardiovascular:  Negative for chest pain and palpitations.   Gastrointestinal:  Negative for abdominal pain and vomiting.   Genitourinary:  Negative for dysuria and hematuria.   Musculoskeletal:  Negative for arthralgias and back pain.   Skin:  Negative for color change and rash.   Neurological:  Negative for seizures and syncope.   All other systems reviewed and are negative.      Objective   /72 (BP Location: Right arm, Patient Position: Sitting, Cuff Size: Standard)   Pulse 74   Ht 5' 8\" (1.727 m)   Wt 75.6 kg (166 lb 9.6 oz)   SpO2 98%   BMI 25.33 kg/m²      Physical Exam  Vitals and nursing note reviewed.   Constitutional:       General: He is not in acute distress.     Appearance: He is well-developed.   HENT:      Head: Normocephalic and atraumatic.     Eyes:      Conjunctiva/sclera: Conjunctivae normal.       Cardiovascular:      Rate and Rhythm: Normal rate and regular rhythm.      Heart sounds: No murmur heard.  Pulmonary:      Effort: Pulmonary effort is normal. No respiratory distress.      Breath sounds: Normal breath sounds.   Abdominal:      Palpations: Abdomen is soft.      Tenderness: There is no abdominal tenderness.     Musculoskeletal:         General: No swelling.      Cervical back: Neck supple.     Skin:     General: Skin is warm and dry.      Capillary Refill: Capillary refill takes less than 2 seconds.     Neurological:      Mental Status: He is alert and oriented to person, place, and time. "     Psychiatric:         Mood and Affect: Mood normal.         Behavior: Behavior normal.       Recent Results (from the past 8 weeks)   Comprehensive metabolic panel    Collection Time: 06/09/25  7:26 AM   Result Value Ref Range    Glucose, Random 87 70 - 99 mg/dL    BUN 13 8 - 27 mg/dL    Creatinine 1.06 0.76 - 1.27 mg/dL    eGFR 80 >59 mL/min/1.73    SL AMB BUN/CREATININE RATIO 12 10 - 24    Sodium 143 134 - 144 mmol/L    Potassium 4.7 3.5 - 5.2 mmol/L    Chloride 104 96 - 106 mmol/L    CO2 21 20 - 29 mmol/L    CALCIUM 9.5 8.6 - 10.2 mg/dL    Protein, Total 6.6 6.0 - 8.5 g/dL    Albumin 4.3 3.9 - 4.9 g/dL    Globulin, Total 2.3 1.5 - 4.5 g/dL    TOTAL BILIRUBIN 0.7 0.0 - 1.2 mg/dL    Alk Phos Isoenzymes 92 44 - 121 IU/L    AST 14 0 - 40 IU/L    ALT 11 0 - 44 IU/L   PSA Total (Reflex To Free)    Collection Time: 06/09/25  7:26 AM   Result Value Ref Range    Prostate Specific Antigen Total 0.5 0.0 - 4.0 ng/mL    Reflex Criteria Comment    Lipid panel    Collection Time: 06/09/25  7:26 AM   Result Value Ref Range    Cholesterol, Total 214 (H) 100 - 199 mg/dL    Triglycerides 106 0 - 149 mg/dL    HDL 54 >39 mg/dL    VLDL Cholesterol Calculated 19 5 - 40 mg/dL    LDL Calculated 141 (H) 0 - 99 mg/dL    T. Chol/HDL Ratio 4.0 0.0 - 5.0 ratio

## 2025-07-17 NOTE — ASSESSMENT & PLAN NOTE
Blood pressure has been stable in the office 123/72 at home sometimes he is noticing higher readings may not be right we will continue to monitor with periodic checks.

## 2025-07-17 NOTE — ASSESSMENT & PLAN NOTE
Continue B12 injections 1000 mcg every 2 weeks last level was 497 we will follow-up with repeat 1 in 6 months from now.  Orders:  •  Vitamin B12; Future